# Patient Record
Sex: MALE | Race: WHITE | Employment: UNEMPLOYED | URBAN - METROPOLITAN AREA
[De-identification: names, ages, dates, MRNs, and addresses within clinical notes are randomized per-mention and may not be internally consistent; named-entity substitution may affect disease eponyms.]

---

## 2017-04-27 ENCOUNTER — OFFICE VISIT (OUTPATIENT)
Dept: CARDIOLOGY CLINIC | Age: 57
End: 2017-04-27

## 2017-04-27 VITALS
OXYGEN SATURATION: 97 % | BODY MASS INDEX: 24.44 KG/M2 | SYSTOLIC BLOOD PRESSURE: 112 MMHG | HEIGHT: 69 IN | DIASTOLIC BLOOD PRESSURE: 64 MMHG | WEIGHT: 165 LBS | HEART RATE: 82 BPM

## 2017-04-27 DIAGNOSIS — Z72.0 TOBACCO ABUSE: ICD-10-CM

## 2017-04-27 DIAGNOSIS — I20.9 ISCHEMIC CHEST PAIN (HCC): ICD-10-CM

## 2017-04-27 DIAGNOSIS — I10 ESSENTIAL HYPERTENSION: ICD-10-CM

## 2017-04-27 DIAGNOSIS — I35.0 NONRHEUMATIC AORTIC VALVE STENOSIS: ICD-10-CM

## 2017-04-27 DIAGNOSIS — I25.10 CORONARY ARTERY DISEASE INVOLVING NATIVE CORONARY ARTERY OF NATIVE HEART WITHOUT ANGINA PECTORIS: ICD-10-CM

## 2017-04-27 DIAGNOSIS — I35.9 AORTIC VALVE DISEASE: Primary | ICD-10-CM

## 2017-04-27 PROCEDURE — 99214 OFFICE O/P EST MOD 30 MIN: CPT | Performed by: INTERNAL MEDICINE

## 2017-05-01 ENCOUNTER — TELEPHONE (OUTPATIENT)
Dept: CARDIOLOGY CLINIC | Age: 57
End: 2017-05-01

## 2017-05-26 ENCOUNTER — OFFICE VISIT (OUTPATIENT)
Dept: CARDIOLOGY CLINIC | Age: 57
End: 2017-05-26

## 2017-05-26 VITALS
DIASTOLIC BLOOD PRESSURE: 74 MMHG | SYSTOLIC BLOOD PRESSURE: 142 MMHG | OXYGEN SATURATION: 97 % | HEART RATE: 67 BPM | HEIGHT: 69 IN | BODY MASS INDEX: 25.33 KG/M2 | WEIGHT: 171 LBS

## 2017-05-26 DIAGNOSIS — I25.118 CORONARY ARTERY DISEASE OF NATIVE ARTERY OF NATIVE HEART WITH STABLE ANGINA PECTORIS (HCC): ICD-10-CM

## 2017-05-26 DIAGNOSIS — I35.0 NONRHEUMATIC AORTIC VALVE STENOSIS: ICD-10-CM

## 2017-05-26 DIAGNOSIS — I20.9 ISCHEMIC CHEST PAIN (HCC): Primary | ICD-10-CM

## 2017-05-26 PROCEDURE — 99214 OFFICE O/P EST MOD 30 MIN: CPT | Performed by: INTERNAL MEDICINE

## 2017-05-26 RX ORDER — LISINOPRIL 10 MG/1
10 TABLET ORAL DAILY
Qty: 30 TABLET | Refills: 11 | Status: SHIPPED | OUTPATIENT
Start: 2017-05-26 | End: 2018-05-17 | Stop reason: SDUPTHER

## 2017-05-26 RX ORDER — NITROGLYCERIN 0.4 MG/1
0.4 TABLET SUBLINGUAL EVERY 5 MIN PRN
Qty: 25 TABLET | Refills: 3 | Status: SHIPPED | OUTPATIENT
Start: 2017-05-26

## 2017-05-26 RX ORDER — METOPROLOL SUCCINATE 50 MG/1
50 TABLET, EXTENDED RELEASE ORAL DAILY
Qty: 30 TABLET | Refills: 11 | Status: SHIPPED | OUTPATIENT
Start: 2017-05-26 | End: 2018-05-17 | Stop reason: SDUPTHER

## 2017-06-26 RX ORDER — ATORVASTATIN CALCIUM 80 MG/1
TABLET, FILM COATED ORAL
Qty: 30 TABLET | Refills: 5 | Status: SHIPPED | OUTPATIENT
Start: 2017-06-26 | End: 2017-12-20 | Stop reason: SDUPTHER

## 2017-10-09 ENCOUNTER — OFFICE VISIT (OUTPATIENT)
Dept: CARDIOLOGY CLINIC | Age: 57
End: 2017-10-09

## 2017-10-09 VITALS
DIASTOLIC BLOOD PRESSURE: 72 MMHG | SYSTOLIC BLOOD PRESSURE: 124 MMHG | OXYGEN SATURATION: 97 % | BODY MASS INDEX: 26.36 KG/M2 | HEIGHT: 69 IN | HEART RATE: 72 BPM | WEIGHT: 178 LBS

## 2017-10-09 DIAGNOSIS — R06.00 DYSPNEA, UNSPECIFIED TYPE: ICD-10-CM

## 2017-10-09 DIAGNOSIS — E78.2 MIXED HYPERLIPIDEMIA: ICD-10-CM

## 2017-10-09 DIAGNOSIS — Z72.0 TOBACCO ABUSE: ICD-10-CM

## 2017-10-09 DIAGNOSIS — I10 ESSENTIAL HYPERTENSION: Primary | ICD-10-CM

## 2017-10-09 DIAGNOSIS — I25.118 CORONARY ARTERY DISEASE OF NATIVE ARTERY OF NATIVE HEART WITH STABLE ANGINA PECTORIS (HCC): ICD-10-CM

## 2017-10-09 PROCEDURE — 99214 OFFICE O/P EST MOD 30 MIN: CPT | Performed by: INTERNAL MEDICINE

## 2017-10-09 RX ORDER — OMEPRAZOLE 20 MG/1
20 CAPSULE, DELAYED RELEASE ORAL DAILY
Qty: 30 CAPSULE | Refills: 3 | Status: SHIPPED | OUTPATIENT
Start: 2017-10-09 | End: 2018-02-19 | Stop reason: SDUPTHER

## 2017-10-09 NOTE — PROGRESS NOTES
[facility] Office Note  10/9/2017     Subjective:  Mr. Tian Montoya is here today for cardiology follow up HTN, CAD, tobacco abuse. Today he reports having brief episodes of chest pain, not requiring nitro. He continues to smoke 1.5 ppd. He is currently not working and is on disability. Denies active chest pain, shortness of breath, edema, dizziness, palpitations and syncope. HPI:   with PMH of tobacco abuse, untreated HTN, daily ETOH use who presents to [facility] 4/25/16 with complaint of chest pain  Describes it as a sharp heavy pressure sensation in the middle of his chest with SOB, and diaphoresis. It was advised to transfer to [facility] for cath but he chose to go home and come next day for procedure. Unfortunately overnight he had suffered a STEMI and was brought to cath lab 4/26/16 which revealed 80% ruptured plaque in mid left anterior descending, s/p 3.0 x 16mm Promus PREMIER drug-eluting stent. 50% mid large first diagonal branch,Severely reduced left ventricular systolic function with ejection fraction 30% with mid anterior, anteroapical as well as inferoapical akinesis. Markedly elevated left ventricular end-diastolic pressure, 36 mmHg. He reported chest pain going to rt arm mostly with exertion for last 6 months. Similar to prior heart attack. He reports not being able to work for [employer], and was let go of his job . He reports being very stressed out past due on rent and ready to be evicted. He continues to smoke but has cut back from 3 ppd to 1.5 ppd. He continues to drink 6-8 beers per day and 1-2 marijuana joints per day. Sonja Fuentes He underwent a cardiac cath on 5/5/17 that showed normal coronaries. He has mild aortic stenosis  He has been taking his medications as prescribed.   Review of Systems: 12 point ROS negative in all areas as listed below except as in [place]  Constitutional, EENT, Cardiovascular, pulmonary, GI, , Musculoskeletal, skin, neurological, hematological, endocrine, Psychiatric      Reviewed past medical history, social, and family history. Smoking 30 cigarettes a day  Smokes 6-8 beers per night and 2 joints of marijuana per day  Past Medical History:   Diagnosis Date    Carotid artery stenosis     Hyperlipidemia     Hypertension      Past Surgical History:   Procedure Laterality Date    CORONARY ANGIOPLASTY WITH STENT PLACEMENT      TONSILLECTOMY         Objective:   /72   Pulse 72   Ht 5' 9\" (1.753 m)   Wt 178 lb (80.7 kg)   SpO2 97%   BMI 26.29 kg/m²     Wt Readings from Last 3 Encounters:   10/09/17 178 lb (80.7 kg)   06/28/17 180 lb (81.6 kg)   05/26/17 171 lb (77.6 kg)       Physical Exam:  General: No Respiratory distress, appears well developed and well nourished. Eyes:  Sclera nonicteric  Nose/Sinuses:  negative findings: nose shows no deformity, asymmetry, or inflammation, nasal mucosa normal, septum midline with no perforation or bleeding  Back:  no pain to palpation  Joint:  no active joint inflammation  Musculoskeletal:  negative  Skin:  Warm and dry  Neck:  Negative for JVD and Carotid Bruits. Chest:  Diminished breath sounds bilat, respiration easy  Cardiovascular:  RRR, S1S2 normal,no murmur heard today  Abdomen:  Soft normal liver and spleen  Extremities:   No edema, clubbing, cyanosis,  Neuro: intact    Medications:   Outpatient Encounter Prescriptions as of 10/9/2017   Medication Sig Dispense Refill    indomethacin (INDOCIN) 50 MG capsule Take 1 capsule by mouth 2 times daily (with meals) 60 capsule 0    colchicine (COLCRYS) 0.6 MG tablet One tablet every two hours until relief of gouty pain and inflammation, up to a total of 3 mg or until upset stomach occurs.  15 tablet 0    atorvastatin (LIPITOR) 80 MG tablet TAKE ONE TABLET BY MOUTH ONCE NIGHTLY AT BEDTIME 30 tablet 5    lisinopril (PRINIVIL;ZESTRIL) 10 MG tablet Take 1 tablet by mouth daily 30 tablet 11    metoprolol succinate (TOPROL XL) 50 MG extended release tablet Take 1 [facility])    Essential hypertension    Tobacco abuse              Plan:  1. Chest X-ray  2. Lipid panel - FASTING  3. Healthy lifestyle education reviewed including nutrition, exercise and activity  4. Smoking cessation discussed at length  5. Follow up with me in 6 months         QUALITY MEASURES  1. Tobacco Cessation Counseling: Yes  2. Retake of BP if >140/90:   NA  3. Documentation to PCP/referring for new patient:  Sent to PCP at close of office visit  4. CAD patient on anti-platelet: Yes  5. CAD patient on STATIN therapy:  Yes  6.  Patient with CHF and aFib on anticoagulation:  NA     [address] Joaquin Wilkins MD 10/9/2017 3:39 PM

## 2017-10-09 NOTE — COMMUNICATION BODY
Khushi Soriano Office Note  10/9/2017     Subjective:  Mr. Diego Johnston is here today for cardiology follow up HTN, CAD, tobacco abuse. Today he reports having brief episodes of chest pain, not requiring nitro. He continues to smoke 1.5 ppd. He is currently not working and is on disability. Denies active chest pain, shortness of breath, edema, dizziness, palpitations and syncope. HPI:   with PMH of tobacco abuse, untreated HTN, daily ETOH use who presents to [facility] 4/25/16 with complaint of chest pain  Describes it as a sharp heavy pressure sensation in the middle of his chest with SOB, and diaphoresis. It was advised to transfer to [facility] for cath but he chose to go home and come next day for procedure. Unfortunately overnight he had suffered a STEMI and was brought to cath lab 4/26/16 which revealed 80% ruptured plaque in mid left anterior descending, s/p 3.0 x 16mm Promus PREMIER drug-eluting stent. 50% mid large first diagonal branch,Severely reduced left ventricular systolic function with ejection fraction 30% with mid anterior, anteroapical as well as inferoapical akinesis. Markedly elevated left ventricular end-diastolic pressure, 36 mmHg. He reported chest pain going to rt arm mostly with exertion for last 6 months. Similar to prior heart attack. He reports not being able to work for [employer], and was let go of his job . He reports being very stressed out past due on rent and ready to be evicted. He continues to smoke but has cut back from 3 ppd to 1.5 ppd. He continues to drink 6-8 beers per day and 1-2 marijuana joints per day. Chon Dunaway He underwent a cardiac cath on 5/5/17 that showed normal coronaries. He has mild aortic stenosis  He has been taking his medications as prescribed.   Review of Systems: 12 point ROS negative in all areas as listed below except as in [place]  Constitutional, EENT, Cardiovascular, pulmonary, GI, , Musculoskeletal, skin, neurological, hematological, endocrine, Mild-moderate aortic regurgitation is present. Echo doppler 4.25.16  Summary  Normal left ventricle size and systolic function with an estimated ejection  fraction of 55%. No regional wall motion abnormalities are seen. There is mild concentric left ventricular hypertrophy. Diastolic filling parameters suggest diastolic dysfunction. Mitral valve is structurally normal.  Mitral valve leaflets appear to open adequately. Mild mitral regurgitation is present. The aortic valve is thickened/calcified with decreased leaflet mobility  consistent with aortic stenosis. The aortic valve area is calculated at 1.32 cm2 with a maximum transaortic  velocity of 3.80 m/sec, a maximum pressure gradient of 58 mmHg and a mean  pressure gradient of 26 mmHg. This is c/w moderate aortic stenosis. Moderate aortic regurgitation is present  CATH 4/26/16  IMPRESSION:  1. A 80% ruptured plaque in mid left anterior descending, s/p 3.0 x 16mm Promus PREMIER  drug-eluting stent. 2. A 50% mid large first diagonal branch. 3. Severely reduced left ventricular systolic function with ejection  fraction 30% with mid anterior, anteroapical as well as inferoapical akinesis. 4. Markedly elevated left ventricular end-diastolic pressure, 36 mmHg. Cath 5/5/17  Findings:  1. No significant coronary artery disease noted within the major epicardial arteries. ~patent LAD stent  2. Normal LVEF  3. Mild aortic valve stenosis   ~23mmHg peak to peak gradient      Conclusion/plan of care:  1. Medical management  2. Stop smoking as probably has endothelial dysfunciton    Assessment:  Kimberly Tee was seen today for 6 month follow-up, coronary artery disease, hypertension, cardiac valve problem, chest pain, shortness of breath and fatigue.     Diagnoses and all orders for this visit:    Aortic valve stenosis mild per cath    Coronary artery disease involving native coronary artery of native heart with unstable  angina pectoris    Ischemic chest pain [facility])    Essential hypertension    Tobacco abuse              Plan:  1. Chest X-ray  2. Lipid panel - FASTING  3. Healthy lifestyle education reviewed including nutrition, exercise and activity  4. Smoking cessation discussed at length  5. Follow up with me in 6 months         QUALITY MEASURES  1. Tobacco Cessation Counseling: Yes  2. Retake of BP if >140/90:   NA  3. Documentation to PCP/referring for new patient:  Sent to PCP at close of office visit  4. CAD patient on anti-platelet: Yes  5. CAD patient on STATIN therapy:  Yes  6.  Patient with CHF and aFib on anticoagulation:  NA     [address] [employer] Sophia Norman MD 10/9/2017 3:39 PM

## 2017-10-13 ENCOUNTER — HOSPITAL ENCOUNTER (OUTPATIENT)
Dept: OTHER | Age: 57
Discharge: HOME | End: 2017-10-13
Attending: INTERNAL MEDICINE | Admitting: INTERNAL MEDICINE

## 2017-10-13 ENCOUNTER — TELEPHONE (OUTPATIENT)
Dept: CARDIOLOGY CLINIC | Age: 57
End: 2017-10-13

## 2017-10-13 DIAGNOSIS — R06.00 DYSPNEA, UNSPECIFIED TYPE: ICD-10-CM

## 2017-10-13 LAB
CHOLESTEROL, FASTING: 119 MG/DL (ref 0–199)
HDLC SERPL-MCNC: 42 MG/DL (ref 40–60)
LDL CHOLESTEROL CALCULATED: 30 MG/DL
TRIGLYCERIDE, FASTING: 234 MG/DL (ref 0–150)
VLDLC SERPL CALC-MCNC: 47 MG/DL

## 2017-10-13 NOTE — TELEPHONE ENCOUNTER
----- Message from Rosy Peterson MD sent at 10/13/2017  2:19 PM EDT -----  Chest xray shows emphysema. He should not be smoking.

## 2017-11-20 NOTE — TELEPHONE ENCOUNTER
Pt called again stating his pharmacy still has not received his medication refill request. Pt stated he has been out of the medication for two days now. Please send medication refill to pt's pharmacy today.

## 2017-11-21 RX ORDER — CLOPIDOGREL BISULFATE 75 MG/1
75 TABLET ORAL DAILY
Qty: 30 TABLET | Refills: 5 | Status: SHIPPED | OUTPATIENT
Start: 2017-11-21 | End: 2018-05-17 | Stop reason: SDUPTHER

## 2017-12-20 NOTE — TELEPHONE ENCOUNTER
Last ov-10/9/17 with RKG  Next ov- not scheduled, due 3/2018  Last labs-10/13/17 Lipid  Last EKG-5/5/17

## 2017-12-21 RX ORDER — ATORVASTATIN CALCIUM 80 MG/1
TABLET, FILM COATED ORAL
Qty: 30 TABLET | Refills: 5 | Status: SHIPPED | OUTPATIENT
Start: 2017-12-21 | End: 2018-07-18 | Stop reason: SDUPTHER

## 2018-02-19 RX ORDER — OMEPRAZOLE 20 MG/1
20 CAPSULE, DELAYED RELEASE ORAL DAILY
Qty: 30 CAPSULE | Refills: 5 | Status: SHIPPED | OUTPATIENT
Start: 2018-02-19 | End: 2018-08-15 | Stop reason: SDUPTHER

## 2018-03-15 LAB
ALBUMIN SERPL-MCNC: 4.7 G/DL
ALP BLD-CCNC: 556 U/L
ALT SERPL-CCNC: 25 U/L
ANION GAP SERPL CALCULATED.3IONS-SCNC: 1.9 MMOL/L
AST SERPL-CCNC: 22 U/L
BILIRUB SERPL-MCNC: 0.4 MG/DL (ref 0.1–1.4)
BUN BLDV-MCNC: 14 MG/DL
CALCIUM SERPL-MCNC: 9.5 MG/DL
CHLORIDE BLD-SCNC: 96 MMOL/L
CHOLESTEROL, TOTAL: 123 MG/DL
CHOLESTEROL/HDL RATIO: NORMAL
CO2: 23 MMOL/L
CREAT SERPL-MCNC: 1.17 MG/DL
GFR CALCULATED: NORMAL
GLUCOSE BLD-MCNC: 89 MG/DL
HDLC SERPL-MCNC: 44 MG/DL (ref 35–70)
LDL CHOLESTEROL CALCULATED: 22 MG/DL (ref 0–160)
POTASSIUM SERPL-SCNC: 4.9 MMOL/L
SODIUM BLD-SCNC: 137 MMOL/L
TOTAL PROTEIN: 7.2
TRIGL SERPL-MCNC: 286 MG/DL
VLDLC SERPL CALC-MCNC: 57 MG/DL

## 2018-04-09 ENCOUNTER — OFFICE VISIT (OUTPATIENT)
Dept: CARDIOLOGY CLINIC | Age: 58
End: 2018-04-09

## 2018-04-09 VITALS
HEART RATE: 68 BPM | OXYGEN SATURATION: 99 % | WEIGHT: 193 LBS | DIASTOLIC BLOOD PRESSURE: 72 MMHG | SYSTOLIC BLOOD PRESSURE: 126 MMHG | HEIGHT: 69 IN | BODY MASS INDEX: 28.58 KG/M2

## 2018-04-09 DIAGNOSIS — I35.9 AORTIC VALVE DISEASE: ICD-10-CM

## 2018-04-09 DIAGNOSIS — I10 ESSENTIAL HYPERTENSION: ICD-10-CM

## 2018-04-09 DIAGNOSIS — I25.10 CORONARY ARTERY DISEASE INVOLVING NATIVE CORONARY ARTERY OF NATIVE HEART WITHOUT ANGINA PECTORIS: Primary | ICD-10-CM

## 2018-04-09 PROCEDURE — 4004F PT TOBACCO SCREEN RCVD TLK: CPT | Performed by: INTERNAL MEDICINE

## 2018-04-09 PROCEDURE — 99214 OFFICE O/P EST MOD 30 MIN: CPT | Performed by: INTERNAL MEDICINE

## 2018-04-09 PROCEDURE — G8427 DOCREV CUR MEDS BY ELIG CLIN: HCPCS | Performed by: INTERNAL MEDICINE

## 2018-04-09 PROCEDURE — G8598 ASA/ANTIPLAT THER USED: HCPCS | Performed by: INTERNAL MEDICINE

## 2018-04-09 PROCEDURE — G8419 CALC BMI OUT NRM PARAM NOF/U: HCPCS | Performed by: INTERNAL MEDICINE

## 2018-04-09 PROCEDURE — 3017F COLORECTAL CA SCREEN DOC REV: CPT | Performed by: INTERNAL MEDICINE

## 2018-04-09 RX ORDER — INDOMETHACIN 50 MG/1
50 CAPSULE ORAL PRN
Qty: 60 CAPSULE | Refills: 0
Start: 2018-04-09 | End: 2022-06-09

## 2018-04-09 RX ORDER — COLCHICINE 0.6 MG/1
TABLET ORAL
Qty: 15 TABLET | Refills: 0
Start: 2018-04-09 | End: 2022-10-21 | Stop reason: SDUPTHER

## 2018-05-17 RX ORDER — CLOPIDOGREL BISULFATE 75 MG/1
75 TABLET ORAL DAILY
Qty: 30 TABLET | Refills: 5 | Status: SHIPPED | OUTPATIENT
Start: 2018-05-17 | End: 2018-10-09 | Stop reason: SDUPTHER

## 2018-05-17 RX ORDER — LISINOPRIL 10 MG/1
TABLET ORAL
Qty: 90 TABLET | Refills: 3 | Status: SHIPPED | OUTPATIENT
Start: 2018-05-17 | End: 2019-05-18 | Stop reason: SDUPTHER

## 2018-05-17 RX ORDER — METOPROLOL SUCCINATE 50 MG/1
50 TABLET, EXTENDED RELEASE ORAL DAILY
Qty: 30 TABLET | Refills: 11 | Status: SHIPPED | OUTPATIENT
Start: 2018-05-17 | End: 2019-04-09 | Stop reason: SDUPTHER

## 2018-07-19 RX ORDER — ATORVASTATIN CALCIUM 80 MG/1
TABLET, FILM COATED ORAL
Qty: 30 TABLET | Refills: 5 | Status: SHIPPED | OUTPATIENT
Start: 2018-07-19 | End: 2019-02-15 | Stop reason: SDUPTHER

## 2018-08-06 ENCOUNTER — HOSPITAL ENCOUNTER (OUTPATIENT)
Dept: NON INVASIVE DIAGNOSTICS | Age: 58
Discharge: HOME | End: 2018-08-06
Payer: COMMERCIAL

## 2018-08-06 DIAGNOSIS — I25.10 ATHEROSCLEROTIC HEART DISEASE OF NATIVE CORONARY ARTERY WITHOUT ANGINA PECTORIS: ICD-10-CM

## 2018-08-06 DIAGNOSIS — I35.9 AORTIC VALVE DISEASE: ICD-10-CM

## 2018-08-06 DIAGNOSIS — I25.10 CORONARY ARTERY DISEASE INVOLVING NATIVE CORONARY ARTERY OF NATIVE HEART WITHOUT ANGINA PECTORIS: ICD-10-CM

## 2018-08-06 LAB
LV EF: 55 %
LVEF MODALITY: NORMAL

## 2018-08-06 PROCEDURE — 93306 TTE W/DOPPLER COMPLETE: CPT

## 2018-08-08 ENCOUNTER — TELEPHONE (OUTPATIENT)
Dept: CARDIOLOGY CLINIC | Age: 58
End: 2018-08-08

## 2018-08-15 RX ORDER — OMEPRAZOLE 20 MG/1
20 CAPSULE, DELAYED RELEASE ORAL DAILY
Qty: 30 CAPSULE | Refills: 5 | Status: SHIPPED | OUTPATIENT
Start: 2018-08-15 | End: 2019-02-15 | Stop reason: SDUPTHER

## 2018-10-09 ENCOUNTER — OFFICE VISIT (OUTPATIENT)
Dept: CARDIOLOGY CLINIC | Age: 58
End: 2018-10-09
Payer: COMMERCIAL

## 2018-10-09 VITALS
HEART RATE: 71 BPM | DIASTOLIC BLOOD PRESSURE: 72 MMHG | SYSTOLIC BLOOD PRESSURE: 124 MMHG | OXYGEN SATURATION: 97 % | BODY MASS INDEX: 29.03 KG/M2 | HEIGHT: 69 IN | WEIGHT: 196 LBS

## 2018-10-09 DIAGNOSIS — I25.118 CORONARY ARTERY DISEASE OF NATIVE ARTERY OF NATIVE HEART WITH STABLE ANGINA PECTORIS (HCC): Primary | ICD-10-CM

## 2018-10-09 DIAGNOSIS — I35.9 AORTIC VALVE DISEASE: ICD-10-CM

## 2018-10-09 DIAGNOSIS — I10 ESSENTIAL HYPERTENSION: ICD-10-CM

## 2018-10-09 PROCEDURE — G8427 DOCREV CUR MEDS BY ELIG CLIN: HCPCS | Performed by: INTERNAL MEDICINE

## 2018-10-09 PROCEDURE — G8484 FLU IMMUNIZE NO ADMIN: HCPCS | Performed by: INTERNAL MEDICINE

## 2018-10-09 PROCEDURE — 3017F COLORECTAL CA SCREEN DOC REV: CPT | Performed by: INTERNAL MEDICINE

## 2018-10-09 PROCEDURE — 4004F PT TOBACCO SCREEN RCVD TLK: CPT | Performed by: INTERNAL MEDICINE

## 2018-10-09 PROCEDURE — G8598 ASA/ANTIPLAT THER USED: HCPCS | Performed by: INTERNAL MEDICINE

## 2018-10-09 PROCEDURE — 99214 OFFICE O/P EST MOD 30 MIN: CPT | Performed by: INTERNAL MEDICINE

## 2018-10-09 PROCEDURE — G8419 CALC BMI OUT NRM PARAM NOF/U: HCPCS | Performed by: INTERNAL MEDICINE

## 2018-10-09 RX ORDER — CLOPIDOGREL BISULFATE 75 MG/1
75 TABLET ORAL DAILY
Qty: 30 TABLET | Refills: 11 | Status: SHIPPED | OUTPATIENT
Start: 2018-10-09 | End: 2019-10-15 | Stop reason: SDUPTHER

## 2018-10-09 NOTE — PROGRESS NOTES
visit:    Aortic valve Mild stenosis mild aortic regurgitation on  echo doppler Aug 2018    Coronary artery disease involving native coronary artery of native heart with  stable  angina pectoris    Essential hypertension    Tobacco abuse 1/2 pack per day    Plan:  1. Healthy lifestyle education reviewed including nutrition, exercise and activity  2. Smoking cessation discussed   3. meds reviewed and refilled as warranted  4. Follow up with me in 6 months will check cmp, lipids prior to visit           QUALITY MEASURES  1. Tobacco Cessation Counseling: Yes  2. Retake of BP if >140/90:   NA  3. Documentation to PCP/referring for new patient:  Sent to PCP at close of office visit  4. CAD patient on anti-platelet: Yes  5. CAD patient on STATIN therapy:  Yes  6.  Patient with CHF and aFib on anticoagulation:  NA     Anthony Otto MD 10/9/2018 12:24 PM

## 2019-02-15 RX ORDER — ATORVASTATIN CALCIUM 80 MG/1
TABLET, FILM COATED ORAL
Qty: 30 TABLET | Refills: 5 | Status: SHIPPED | OUTPATIENT
Start: 2019-02-15 | End: 2019-08-16 | Stop reason: SDUPTHER

## 2019-02-15 RX ORDER — OMEPRAZOLE 20 MG/1
20 CAPSULE, DELAYED RELEASE ORAL DAILY
Qty: 30 CAPSULE | Refills: 5 | Status: SHIPPED | OUTPATIENT
Start: 2019-02-15 | End: 2019-08-16 | Stop reason: SDUPTHER

## 2019-04-09 ENCOUNTER — OFFICE VISIT (OUTPATIENT)
Dept: CARDIOLOGY CLINIC | Age: 59
End: 2019-04-09
Payer: COMMERCIAL

## 2019-04-09 VITALS
WEIGHT: 196 LBS | HEART RATE: 73 BPM | DIASTOLIC BLOOD PRESSURE: 68 MMHG | OXYGEN SATURATION: 97 % | SYSTOLIC BLOOD PRESSURE: 120 MMHG | BODY MASS INDEX: 29.03 KG/M2 | HEIGHT: 69 IN

## 2019-04-09 DIAGNOSIS — I10 ESSENTIAL HYPERTENSION: ICD-10-CM

## 2019-04-09 DIAGNOSIS — I35.0 NONRHEUMATIC AORTIC VALVE STENOSIS: ICD-10-CM

## 2019-04-09 DIAGNOSIS — I25.118 CORONARY ARTERY DISEASE OF NATIVE ARTERY OF NATIVE HEART WITH STABLE ANGINA PECTORIS (HCC): Primary | ICD-10-CM

## 2019-04-09 PROCEDURE — G8598 ASA/ANTIPLAT THER USED: HCPCS | Performed by: INTERNAL MEDICINE

## 2019-04-09 PROCEDURE — 3017F COLORECTAL CA SCREEN DOC REV: CPT | Performed by: INTERNAL MEDICINE

## 2019-04-09 PROCEDURE — 4004F PT TOBACCO SCREEN RCVD TLK: CPT | Performed by: INTERNAL MEDICINE

## 2019-04-09 PROCEDURE — G8427 DOCREV CUR MEDS BY ELIG CLIN: HCPCS | Performed by: INTERNAL MEDICINE

## 2019-04-09 PROCEDURE — 99214 OFFICE O/P EST MOD 30 MIN: CPT | Performed by: INTERNAL MEDICINE

## 2019-04-09 PROCEDURE — G8419 CALC BMI OUT NRM PARAM NOF/U: HCPCS | Performed by: INTERNAL MEDICINE

## 2019-04-09 RX ORDER — METOPROLOL SUCCINATE 50 MG/1
50 TABLET, EXTENDED RELEASE ORAL DAILY
Qty: 30 TABLET | Refills: 11 | Status: SHIPPED | OUTPATIENT
Start: 2019-04-09 | End: 2020-04-08

## 2019-04-09 NOTE — PATIENT INSTRUCTIONS
[address] Office Note  4/9/2019     Subjective:  Mr. Evonne Blackmon is here today for cardiology follow up HTN, CAD, tobacco abuse, aortic stenosis. Today he reports to feeling good overall. He continues to smoke 1.5 ppd. He is currently  on disability. Denies active chest pain, shortness of breath, edema, dizziness, palpitations and syncope. He continues to drink 9  Beers on Saturday as well as smoking marijuana also on Saturday. HPI:   with PMH of tobacco abuse, untreated HTN, daily ETOH use who presents to [facility] 4/25/16 with complaint of chest pain  Describes it as a sharp heavy pressure sensation in the middle of his chest with SOB, and diaphoresis. It was advised to transfer to [facility] for cath but he chose to go home and come next day for procedure. Unfortunately overnight he had suffered a STEMI and was brought to cath lab 4/26/16 which revealed 80% ruptured plaque in mid left anterior descending, s/p 3.0 x 16mm Promus PREMIER drug-eluting stent. 50% mid large first diagonal branch,Severely reduced left ventricular systolic function with ejection fraction 30% with mid anterior, anteroapical as well as inferoapical akinesis. Markedly elevated left ventricular end-diastolic pressure, 36 mmHg. He reported chest pain going to rt arm mostly with exertion for last 6 months. Similar to prior heart attack. He reports not being able to work for [employer], and was let go of his job . He reports being very stressed out past due on rent and ready to be evicted. He continues to smoke but has cut back from 3 ppd to 1.5 ppd. He continues to drink 6-8 beers per day and 1-2 marijuana joints per day. Maxwell Brown He underwent a cardiac cath on 5/5/17 that showed normal coronaries. He has mild aortic stenosis  He has been taking his medications as prescribed.   Review of Systems: 12 point ROS negative in all areas as listed below except as in [place]  Constitutional, EENT, Cardiovascular, pulmonary, GI, , Musculoskeletal, skin, neurological, hematological, endocrine, Psychiatric      Reviewed past medical history, social, and family history. Smoking 30 cigarettes a day  Smokes 6-8 beers per night and 2 joints of marijuana per day  Past Medical History:   Diagnosis Date    Carotid artery stenosis     Hyperlipidemia     Hypertension      Past Surgical History:   Procedure Laterality Date    CORONARY ANGIOPLASTY WITH STENT PLACEMENT      TONSILLECTOMY         Objective:   /68   Pulse 73   Ht 5' 9\" (1.753 m)   Wt 196 lb (88.9 kg)   SpO2 97%   BMI 28.94 kg/m²      Wt Readings from Last 3 Encounters:   04/09/19 196 lb (88.9 kg)   10/09/18 196 lb (88.9 kg)   06/20/18 170 lb (77.1 kg)       Physical Exam:  General: No Respiratory distress, appears well developed and well nourished. Eyes:  Sclera nonicteric  Nose/Sinuses:  negative findings: nose shows no deformity, asymmetry, or inflammation, nasal mucosa normal, septum midline with no perforation or bleeding  Back:  no pain to palpation  Joint:  no active joint inflammation  Musculoskeletal:  negative  Skin:  Warm and dry  Neck:  Negative for JVD and Carotid Bruits.    Chest:  Clear breath sounds bilat, respiration easy  Cardiovascular:  RRR, S1S2 normal, 1/6 GALINA at base of heart  Abdomen:  Soft normal liver and spleen  Extremities:   No edema, clubbing, cyanosis,  Neuro: intact    Medications:   Outpatient Encounter Medications as of 4/9/2019   Medication Sig Dispense Refill    omeprazole (PRILOSEC) 20 MG delayed release capsule Take 1 capsule by mouth daily 30 capsule 5    atorvastatin (LIPITOR) 80 MG tablet TAKE ONE TABLET BY MOUTH EVERY NIGHT AT BEDTIME 30 tablet 5    clopidogrel (PLAVIX) 75 MG tablet Take 1 tablet by mouth daily 30 tablet 11    lisinopril (PRINIVIL;ZESTRIL) 10 MG tablet TAKE ONE TABLET BY MOUTH DAILY 90 tablet 3    metoprolol succinate (TOPROL XL) 50 MG extended release tablet Take 1 tablet by mouth daily 30 tablet 11    Overall left ventricular   function is normal.   No regional wall motion abnormalities are noted.   AV: mild-moderate aortic stenosis. Mild-moderate aortic regurgitation is present. Echo doppler 4.25.16  Summary  Normal left ventricle size and systolic function with an estimated ejection  fraction of 55%. No regional wall motion abnormalities are seen. There is mild concentric left ventricular hypertrophy. Diastolic filling parameters suggest diastolic dysfunction. Mitral valve is structurally normal.  Mitral valve leaflets appear to open adequately. Mild mitral regurgitation is present. The aortic valve is thickened/calcified with decreased leaflet mobility  consistent with aortic stenosis. The aortic valve area is calculated at 1.32 cm2 with a maximum transaortic  velocity of 3.80 m/sec, a maximum pressure gradient of 58 mmHg and a mean  pressure gradient of 26 mmHg. This is c/w moderate aortic stenosis. Moderate aortic regurgitation is present  CATH 4/26/16  IMPRESSION:  1. A 80% ruptured plaque in mid left anterior descending, s/p 3.0 x 16mm Promus PREMIER  drug-eluting stent. 2. A 50% mid large first diagonal branch. 3. Severely reduced left ventricular systolic function with ejection  fraction 30% with mid anterior, anteroapical as well as inferoapical akinesis. 4. Markedly elevated left ventricular end-diastolic pressure, 36 mmHg. Cath 5/5/17  Findings:  1. No significant coronary artery disease noted within the major epicardial arteries. ~patent LAD stent  2. Normal LVEF  3. Mild aortic valve stenosis   ~23mmHg peak to peak gradient      Conclusion/plan of care:  1. Medical management  2.  Stop smoking as probably has endothelial dysfunciton    Assessment:  Yaima Rush was seen today for 6 month follow-up, coronary artery disease, hypertension, cardiac valve problem,Diagnoses and all orders for this visit:    Aortic valve Mild stenosis mild aortic regurgitation on  echo doppler Aug 2018    Coronary artery disease involving native coronary artery of native heart with  stable  angina pectoris  Last lipids 3/15/18 I personally reviewed labs results in epic and discussed with patient      Essential hypertension    Tobacco abuse 11/2 pack per day    Plan:  1. Healthy lifestyle education reviewed including nutrition, exercise and activity  2. Smoking cessation discussed   3. meds reviewed and refilled as warranted  4. Follow up with me in 6 months   5. will check cmp, lipids now   6. Recheck ECHO early September 1-800 quit now advised for free smoking cessation program        QUALITY MEASURES  1. Tobacco Cessation Counseling: Yes  2. Retake of BP if >140/90:   NA  3. Documentation to PCP/referring for new patient:  Sent to PCP at close of office visit  4. CAD patient on anti-platelet: Yes  5. CAD patient on STATIN therapy:  Yes  6. Patient with CHF and aFib on anticoagulation:  NA     This note was scribed in the presence of  Rima William MD by Edvin Pabon RN  I, Dr. Rima William, personally performed the services described in this documentation, as scribed by the above signed scribe in my presence. It is both accurate and complete to my knowledge. I agree with the details independently gathered by the clinical support staff, while the remaining scribed note accurately describes my personal service to the patient.       [address] Jean Thakkar MD 4/9/2019 2:00 PM

## 2019-04-09 NOTE — PROGRESS NOTES
[facility] Office Note  4/9/2019     Subjective:  Mr. Aorldo Goodwin is here today for cardiology follow up HTN, CAD, tobacco abuse, aortic stenosis. Today he reports to feeling good overall. He continues to smoke 1.5 ppd. He is currently  on disability. Denies active chest pain, shortness of breath, edema, dizziness, palpitations and syncope. He continues to drink 9  Beers on Saturday as well as smoking marijuana also on Saturday. HPI:   with PMH of tobacco abuse, untreated HTN, daily ETOH use who presents to [facility] 4/25/16 with complaint of chest pain  Describes it as a sharp heavy pressure sensation in the middle of his chest with SOB, and diaphoresis. It was advised to transfer to [facility] for cath but he chose to go home and come next day for procedure. Unfortunately overnight he had suffered a STEMI and was brought to cath lab 4/26/16 which revealed 80% ruptured plaque in mid left anterior descending, s/p 3.0 x 16mm Promus PREMIER drug-eluting stent. 50% mid large first diagonal branch,Severely reduced left ventricular systolic function with ejection fraction 30% with mid anterior, anteroapical as well as inferoapical akinesis. Markedly elevated left ventricular end-diastolic pressure, 36 mmHg. He reported chest pain going to rt arm mostly with exertion for last 6 months. Similar to prior heart attack. He reports not being able to work for [employer], and was let go of his job . He reports being very stressed out past due on rent and ready to be evicted. He continues to smoke but has cut back from 3 ppd to 1.5 ppd. He continues to drink 6-8 beers per day and 1-2 marijuana joints per day. Adeline Johnson He underwent a cardiac cath on 5/5/17 that showed normal coronaries. He has mild aortic stenosis  He has been taking his medications as prescribed.   Review of Systems: 12 point ROS negative in all areas as listed below except as in [place]  Constitutional, EENT, Cardiovascular, pulmonary, GI, , Musculoskeletal, skin, neurological, hematological, endocrine, Psychiatric      Reviewed past medical history, social, and family history. Smoking 30 cigarettes a day  Smokes 6-8 beers per night and 2 joints of marijuana per day  Past Medical History:   Diagnosis Date    Carotid artery stenosis     Hyperlipidemia     Hypertension      Past Surgical History:   Procedure Laterality Date    CORONARY ANGIOPLASTY WITH STENT PLACEMENT      TONSILLECTOMY         Objective:   /68   Pulse 73   Ht 5' 9\" (1.753 m)   Wt 196 lb (88.9 kg)   SpO2 97%   BMI 28.94 kg/m²     Wt Readings from Last 3 Encounters:   04/09/19 196 lb (88.9 kg)   10/09/18 196 lb (88.9 kg)   06/20/18 170 lb (77.1 kg)       Physical Exam:  General: No Respiratory distress, appears well developed and well nourished. Eyes:  Sclera nonicteric  Nose/Sinuses:  negative findings: nose shows no deformity, asymmetry, or inflammation, nasal mucosa normal, septum midline with no perforation or bleeding  Back:  no pain to palpation  Joint:  no active joint inflammation  Musculoskeletal:  negative  Skin:  Warm and dry  Neck:  Negative for JVD and Carotid Bruits.    Chest:  Clear breath sounds bilat, respiration easy  Cardiovascular:  RRR, S1S2 normal, 1/6 GALINA at base of heart  Abdomen:  Soft normal liver and spleen  Extremities:   No edema, clubbing, cyanosis,  Neuro: intact    Medications:   Outpatient Encounter Medications as of 4/9/2019   Medication Sig Dispense Refill    omeprazole (PRILOSEC) 20 MG delayed release capsule Take 1 capsule by mouth daily 30 capsule 5    atorvastatin (LIPITOR) 80 MG tablet TAKE ONE TABLET BY MOUTH EVERY NIGHT AT BEDTIME 30 tablet 5    clopidogrel (PLAVIX) 75 MG tablet Take 1 tablet by mouth daily 30 tablet 11    lisinopril (PRINIVIL;ZESTRIL) 10 MG tablet TAKE ONE TABLET BY MOUTH DAILY 90 tablet 3    metoprolol succinate (TOPROL XL) 50 MG extended release tablet Take 1 tablet by mouth daily 30 tablet 11    Overall left ventricular   function is normal.   No regional wall motion abnormalities are noted.   AV: mild-moderate aortic stenosis. Mild-moderate aortic regurgitation is present. Echo doppler 4.25.16  Summary  Normal left ventricle size and systolic function with an estimated ejection  fraction of 55%. No regional wall motion abnormalities are seen. There is mild concentric left ventricular hypertrophy. Diastolic filling parameters suggest diastolic dysfunction. Mitral valve is structurally normal.  Mitral valve leaflets appear to open adequately. Mild mitral regurgitation is present. The aortic valve is thickened/calcified with decreased leaflet mobility  consistent with aortic stenosis. The aortic valve area is calculated at 1.32 cm2 with a maximum transaortic  velocity of 3.80 m/sec, a maximum pressure gradient of 58 mmHg and a mean  pressure gradient of 26 mmHg. This is c/w moderate aortic stenosis. Moderate aortic regurgitation is present  CATH 4/26/16  IMPRESSION:  1. A 80% ruptured plaque in mid left anterior descending, s/p 3.0 x 16mm Promus PREMIER  drug-eluting stent. 2. A 50% mid large first diagonal branch. 3. Severely reduced left ventricular systolic function with ejection  fraction 30% with mid anterior, anteroapical as well as inferoapical akinesis. 4. Markedly elevated left ventricular end-diastolic pressure, 36 mmHg. Cath 5/5/17  Findings:  1. No significant coronary artery disease noted within the major epicardial arteries. ~patent LAD stent  2. Normal LVEF  3. Mild aortic valve stenosis   ~23mmHg peak to peak gradient      Conclusion/plan of care:  1. Medical management  2.  Stop smoking as probably has endothelial dysfunciton    Assessment:  Rosangela Martin was seen today for 6 month follow-up, coronary artery disease, hypertension, cardiac valve problem,Diagnoses and all orders for this visit:    Aortic valve Mild stenosis mild aortic regurgitation on  echo doppler Aug

## 2019-05-20 RX ORDER — LISINOPRIL 10 MG/1
TABLET ORAL
Qty: 90 TABLET | Refills: 2 | Status: SHIPPED | OUTPATIENT
Start: 2019-05-20 | End: 2020-02-10

## 2019-08-16 RX ORDER — OMEPRAZOLE 20 MG/1
CAPSULE, DELAYED RELEASE ORAL
Qty: 90 CAPSULE | Refills: 3 | Status: SHIPPED | OUTPATIENT
Start: 2019-08-16 | End: 2019-10-10 | Stop reason: ALTCHOICE

## 2019-08-16 RX ORDER — ATORVASTATIN CALCIUM 80 MG/1
TABLET, FILM COATED ORAL
Qty: 90 TABLET | Refills: 3 | Status: SHIPPED | OUTPATIENT
Start: 2019-08-16 | End: 2020-08-06

## 2019-09-24 ENCOUNTER — HOSPITAL ENCOUNTER (OUTPATIENT)
Dept: NON INVASIVE DIAGNOSTICS | Age: 59
Discharge: HOME | End: 2019-09-24
Payer: COMMERCIAL

## 2019-09-24 ENCOUNTER — HOSPITAL ENCOUNTER (OUTPATIENT)
Age: 59
Discharge: HOME | End: 2019-09-24
Payer: COMMERCIAL

## 2019-09-24 DIAGNOSIS — I10 ESSENTIAL HYPERTENSION: ICD-10-CM

## 2019-09-24 DIAGNOSIS — I25.118 CORONARY ARTERY DISEASE OF NATIVE ARTERY OF NATIVE HEART WITH STABLE ANGINA PECTORIS (HCC): ICD-10-CM

## 2019-09-24 DIAGNOSIS — I35.0 NONRHEUMATIC AORTIC VALVE STENOSIS: ICD-10-CM

## 2019-09-24 LAB
A/G RATIO: 1.6 (ref 1.1–2.2)
ALBUMIN SERPL-MCNC: 4.5 G/DL (ref 3.4–5)
ALP BLD-CCNC: 53 U/L (ref 40–129)
ALT SERPL-CCNC: 23 U/L (ref 10–40)
ANION GAP SERPL CALCULATED.3IONS-SCNC: 14 MMOL/L (ref 3–16)
AST SERPL-CCNC: 17 U/L (ref 15–37)
BILIRUB SERPL-MCNC: 0.5 MG/DL (ref 0–1)
BUN BLDV-MCNC: 9 MG/DL (ref 7–20)
CALCIUM SERPL-MCNC: 9.4 MG/DL (ref 8.3–10.6)
CHLORIDE BLD-SCNC: 102 MMOL/L (ref 99–110)
CHOLESTEROL, FASTING: 113 MG/DL (ref 0–199)
CO2: 25 MMOL/L (ref 21–32)
CREAT SERPL-MCNC: 1 MG/DL (ref 0.9–1.3)
GFR AFRICAN AMERICAN: >60
GFR NON-AFRICAN AMERICAN: >60
GLOBULIN: 2.9 G/DL
GLUCOSE FASTING: 100 MG/DL (ref 70–99)
HDLC SERPL-MCNC: 42 MG/DL (ref 40–60)
LDL CHOLESTEROL CALCULATED: 18 MG/DL
LV EF: 55 %
LVEF MODALITY: NORMAL
POTASSIUM SERPL-SCNC: 4.4 MMOL/L (ref 3.5–5.1)
SODIUM BLD-SCNC: 141 MMOL/L (ref 136–145)
TOTAL PROTEIN: 7.4 G/DL (ref 6.4–8.2)
TRIGLYCERIDE, FASTING: 265 MG/DL (ref 0–150)
VLDLC SERPL CALC-MCNC: 53 MG/DL

## 2019-09-24 PROCEDURE — 80053 COMPREHEN METABOLIC PANEL: CPT

## 2019-09-24 PROCEDURE — 93306 TTE W/DOPPLER COMPLETE: CPT

## 2019-09-24 PROCEDURE — 36415 COLL VENOUS BLD VENIPUNCTURE: CPT

## 2019-09-24 PROCEDURE — 80061 LIPID PANEL: CPT

## 2019-10-10 ENCOUNTER — OFFICE VISIT (OUTPATIENT)
Dept: CARDIOLOGY CLINIC | Age: 59
End: 2019-10-10
Payer: COMMERCIAL

## 2019-10-10 VITALS
HEART RATE: 71 BPM | BODY MASS INDEX: 28.73 KG/M2 | DIASTOLIC BLOOD PRESSURE: 60 MMHG | OXYGEN SATURATION: 98 % | SYSTOLIC BLOOD PRESSURE: 102 MMHG | HEIGHT: 69 IN | WEIGHT: 194 LBS

## 2019-10-10 DIAGNOSIS — I35.0 AORTIC STENOSIS, MODERATE: Primary | ICD-10-CM

## 2019-10-10 DIAGNOSIS — I10 ESSENTIAL HYPERTENSION: ICD-10-CM

## 2019-10-10 DIAGNOSIS — I25.118 CORONARY ARTERY DISEASE OF NATIVE ARTERY OF NATIVE HEART WITH STABLE ANGINA PECTORIS (HCC): ICD-10-CM

## 2019-10-10 DIAGNOSIS — I35.1 MODERATE AORTIC REGURGITATION: ICD-10-CM

## 2019-10-10 PROCEDURE — 99214 OFFICE O/P EST MOD 30 MIN: CPT | Performed by: INTERNAL MEDICINE

## 2019-10-10 PROCEDURE — G8419 CALC BMI OUT NRM PARAM NOF/U: HCPCS | Performed by: INTERNAL MEDICINE

## 2019-10-10 PROCEDURE — 4004F PT TOBACCO SCREEN RCVD TLK: CPT | Performed by: INTERNAL MEDICINE

## 2019-10-10 PROCEDURE — G8598 ASA/ANTIPLAT THER USED: HCPCS | Performed by: INTERNAL MEDICINE

## 2019-10-10 PROCEDURE — G8484 FLU IMMUNIZE NO ADMIN: HCPCS | Performed by: INTERNAL MEDICINE

## 2019-10-10 PROCEDURE — G8427 DOCREV CUR MEDS BY ELIG CLIN: HCPCS | Performed by: INTERNAL MEDICINE

## 2019-10-10 PROCEDURE — 3017F COLORECTAL CA SCREEN DOC REV: CPT | Performed by: INTERNAL MEDICINE

## 2019-10-10 RX ORDER — RANITIDINE 150 MG/1
150 TABLET ORAL 2 TIMES DAILY
Qty: 180 TABLET | Refills: 3 | Status: SHIPPED | OUTPATIENT
Start: 2019-10-10 | End: 2019-10-24 | Stop reason: ALTCHOICE

## 2019-10-15 RX ORDER — CLOPIDOGREL BISULFATE 75 MG/1
TABLET ORAL
Qty: 30 TABLET | Refills: 11 | Status: SHIPPED | OUTPATIENT
Start: 2019-10-15 | End: 2020-10-05

## 2019-10-23 ENCOUNTER — TELEPHONE (OUTPATIENT)
Dept: CARDIOLOGY CLINIC | Age: 59
End: 2019-10-23

## 2019-10-24 RX ORDER — OMEPRAZOLE 20 MG/1
20 CAPSULE, DELAYED RELEASE ORAL DAILY
Qty: 30 CAPSULE | Refills: 5 | Status: SHIPPED | OUTPATIENT
Start: 2019-10-24 | End: 2020-09-08

## 2020-02-10 RX ORDER — LISINOPRIL 10 MG/1
TABLET ORAL
Qty: 30 TABLET | Refills: 6 | Status: SHIPPED | OUTPATIENT
Start: 2020-02-10 | End: 2020-09-08

## 2020-04-08 RX ORDER — METOPROLOL SUCCINATE 50 MG/1
TABLET, EXTENDED RELEASE ORAL
Qty: 30 TABLET | Refills: 11 | Status: SHIPPED | OUTPATIENT
Start: 2020-04-08 | End: 2021-04-05

## 2020-08-06 RX ORDER — ATORVASTATIN CALCIUM 80 MG/1
TABLET, FILM COATED ORAL
Qty: 30 TABLET | Refills: 2 | Status: SHIPPED | OUTPATIENT
Start: 2020-08-06 | End: 2020-11-04

## 2020-08-17 NOTE — PROGRESS NOTES
[address] Office Note  8/18/2020     Subjective:  Mr. Kimber Samuel is here today for cardiology follow up HTN, CAD, active  tobacco abuse, aortic stenosis. Reports exertional chest pain    HPI:    Today he reports that  He will have an occasional exertional chest pain, pressure/heaviness, such as walking longer distances or with working in the heat. Goes away with resting after 5-10 minutes. Pain occurs 1 episode week/spordic. Also continues to have baseline SOB from smoking especially in heat. He continues to smoke 1.5 ppd. He is currently  on disability. He continues to drink 6 pack every day as well as smoking marijuana 2-3 times per week. Otherwise Denies  edema, dizziness,  and syncope. PMH   tobacco abuse, untreated HTN, daily ETOH use who presents to [facility] 4/25/16 with complaint of chest pain  Describes it as a sharp heavy pressure sensation in the middle of his chest with SOB, and diaphoresis. It was advised to transfer to [facility] for cath but he chose to go home and come next day for procedure. Unfortunately overnight he had suffered a STEMI and was brought to cath lab 4/26/16 which revealed 80% ruptured plaque in mid left anterior descending, s/p 3.0 x 16mm Promus PREMIER drug-eluting stent. 50% mid large first diagonal branch,Severely reduced left ventricular systolic function with ejection fraction 30% with mid anterior, anteroapical as well as inferoapical akinesis. Markedly elevated left ventricular end-diastolic pressure, 36 mmHg. He reported chest pain going to rt arm mostly with exertion for last 6 months. Similar to prior heart attack. He reports not being able to work for [employer], and was let go of his job . He reports being very stressed out past due on rent and ready to be evicted. He continues to smoke but has cut back from 3 ppd to 1.5 ppd. He continues to drink 6-8 beers per day and 1-2 marijuana joints per day. Zac Talavera     He underwent a cardiac cath on 5/5/17 that showed normal coronaries. He has mild aortic stenosis  He has been taking his medications as prescribed. Review of Systems: 12 point ROS negative in all areas as listed below except as in [place]  Constitutional, EENT, Cardiovascular, pulmonary, GI, , Musculoskeletal, skin, neurological, hematological, endocrine, Psychiatric      Reviewed past medical history, social, and family history. Smoking 30 cigarettes a day  Smokes 6-8 beers per night and 2 joints of marijuana per day  Past Medical History:   Diagnosis Date    Carotid artery stenosis     Hyperlipidemia     Hypertension      Past Surgical History:   Procedure Laterality Date    CORONARY ANGIOPLASTY WITH STENT PLACEMENT      TONSILLECTOMY         Objective:   BP (!) 140/60   Pulse 75   Temp 96.4 °F (35.8 °C)   Ht 5' 9\" (1.753 m)   Wt 196 lb (88.9 kg)   SpO2 98%   BMI 28.94 kg/m²     Wt Readings from Last 3 Encounters:   08/18/20 196 lb (88.9 kg)   10/10/19 194 lb (88 kg)   04/09/19 196 lb (88.9 kg)       Physical Exam:  General: No Respiratory distress, appears well developed and well nourished. Eyes:  Sclera nonicteric  Nose/Sinuses:  negative findings: nose shows no deformity, asymmetry, or inflammation, nasal mucosa normal, septum midline with no perforation or bleeding  Back:  no pain to palpation  Joint:  no active joint inflammation  Musculoskeletal:  negative  Skin:  Warm and dry  Neck:  Negative for JVD and Carotid Bruits.    Chest:  Rhoncus breath sounds bilat, respiration easy  Cardiovascular:  RRR, S1S2 normal, 1/6 GALINA at base of heart  Abdomen:  Soft normal liver and spleen  Extremities:   No edema, clubbing, cyanosis,  Neuro: intact    Medications:   Outpatient Encounter Medications as of 8/18/2020   Medication Sig Dispense Refill    atorvastatin (LIPITOR) 80 MG tablet TAKE ONE TABLET BY MOUTH EVERY NIGHT AT BEDTIME 30 tablet 2    metoprolol succinate (TOPROL XL) 50 MG extended release tablet TAKE ONE TABLET BY MOUTH DAILY 30 tablet 11    lisinopril (PRINIVIL;ZESTRIL) 10 MG tablet TAKE ONE TABLET BY MOUTH DAILY 30 tablet 6    omeprazole (PRILOSEC) 20 MG delayed release capsule Take 1 capsule by mouth daily 30 capsule 5    clopidogrel (PLAVIX) 75 MG tablet TAKE ONE TABLET BY MOUTH DAILY 30 tablet 11    indomethacin (INDOCIN) 50 MG capsule Take 1 capsule by mouth as needed for Pain 60 capsule 0    colchicine (COLCRYS) 0.6 MG tablet prn 15 tablet 0    nitroGLYCERIN (NITROSTAT) 0.4 MG SL tablet Place 1 tablet under the tongue every 5 minutes as needed for Chest pain 25 tablet 3    aspirin 81 MG chewable tablet Take 1 tablet by mouth daily 30 tablet 3     No facility-administered encounter medications on file as of 8/18/2020. LIPID:   Lab Results   Component Value Date    CHOL 123 03/15/2018    CHOL 136 05/05/2017    CHOL 189 04/26/2016     Lab Results   Component Value Date    TRIG 286 03/15/2018    TRIG 610 (H) 05/05/2017    TRIG 187 (H) 04/26/2016     Lab Results   Component Value Date    HDL 42 09/24/2019    HDL 44 03/15/2018    HDL 42 10/13/2017     Lab Results   Component Value Date    LDLCALC 18 09/24/2019    LDLCALC 22 03/15/2018    LDLCALC 30 10/13/2017     Lab Results   Component Value Date    LABVLDL 53 09/24/2019    LABVLDL 47 10/13/2017    LABVLDL see below 05/05/2017    VLDL 57 03/15/2018     No results found for: CHOLHDLRATIO  PT/INR: No results for input(s): PROTIME, INR in the last 72 hours. A1C: No results found for: LABA1C  BNP:  No results for input(s): BNP in the last 72 hours.     IMAGING:   ECHO 9/24/19  Summary   Left ventricular systolic function is normal with a visually estimated   ejection fraction of 55%.   The left ventricle is normal in size with mild concentric hypertrophy.   No regional wall motion abnormalities are noted.   Normal left ventricular diastolic function.   Aortic valve area calculated at 1.25 cm2 with a maximum velocity of 3.39   m/s, a maximum pressure gradient of 46 mmHg and a mean pressure gradient of   23 mmHg, consistent with moderate aortic stenosis.   There is moderate eccentric aortic valve regurgitation.   The aortic valve is bicuspid in appearance.   The aortic root is mildly dilated at 3.8 cm.   A coarctation or otherwise narrow descending thoracic aorta cannot be   excluded. Suggest CT chest to further evaluate.   Mild pulmonic regurgitation. ECHO 8/6/18  Summary   Ejection fraction is visually estimated to be 55 %.   There is mild concentric left ventricular hypertrophy.   Left ventricle size is normal.   Normal left ventricular diastolic filling pressure.   The aortic valve is thickened/calcified with decreased leaflet mobility   consistent with aortic stenosis.   By Doppler examination this is c/w mild aortic stenosis.   Mild aortic regurgitation.   Aortic valve is bicuspid    Limited ECHO 8/1/16  Summary   This is a limited study for follow-up.   Compared to echo of 4/25/2016, no significant change in degree of aortic   stenosis, LVEF appears a little more hyperdynamic.   Ejection fraction is visually estimated to be 65 %. Overall left ventricular   function is normal.   No regional wall motion abnormalities are noted.   AV: mild-moderate aortic stenosis. Mild-moderate aortic regurgitation is present. Echo doppler 4.25.16  Summary  Normal left ventricle size and systolic function with an estimated ejection  fraction of 55%. No regional wall motion abnormalities are seen. There is mild concentric left ventricular hypertrophy. Diastolic filling parameters suggest diastolic dysfunction. Mitral valve is structurally normal.  Mitral valve leaflets appear to open adequately. Mild mitral regurgitation is present. The aortic valve is thickened/calcified with decreased leaflet mobility  consistent with aortic stenosis.   The aortic valve area is calculated at 1.32 cm2 with a maximum transaortic  velocity of 3.80 m/sec, a maximum pressure gradient of 58 mmHg and a mean  pressure gradient of 26 mmHg. This is c/w moderate aortic stenosis. Moderate aortic regurgitation is present  CATH 4/26/16  IMPRESSION:  1. A 80% ruptured plaque in mid left anterior descending, s/p 3.0 x 16mm Promus PREMIER  drug-eluting stent. 2. A 50% mid large first diagonal branch. 3. Severely reduced left ventricular systolic function with ejection  fraction 30% with mid anterior, anteroapical as well as inferoapical akinesis. 4. Markedly elevated left ventricular end-diastolic pressure, 36 mmHg. Cath 5/5/17  Findings:  1. No significant coronary artery disease noted within the major epicardial arteries. ~patent LAD stent  2. Normal LVEF  3. Mild aortic valve stenosis   ~23mmHg peak to peak gradient      Conclusion/plan of care:  1. Medical management  2. Stop smoking as probably has endothelial dysfunciton    Assessment:  Encounter Diagnoses   Name Primary?  Coronary artery disease involving native coronary artery of native heart with unstable angina pectoris ([address] Yes    Essential hypertension     Nonrheumatic aortic valve stenosis     Unstable angina pectoris (HCC)        Herlinda Anthony was seen today for 6 month follow-up, coronary artery disease, hypertension, cardiac valve problem, now with chest pain Diagnoses and all orders for this visit:    Aortic valve moderate stenosis moderate aortic regurgitation on  echo doppler September 2020    Coronary artery disease involving native coronary artery of native heart with  stable  angina pectoris  Last lipids 3/15/18 I personally reviewed labs results in epic and discussed with patient      Essential hypertension    Tobacco abuse 11/2 pack per day    Plan:  1. Will order Lexiscan stress test to evaluate for new blockage  2. Smoking cessation discussed He is not trying hard to quit help offered   3. meds reviewed and refilled as warranted  4. Follow up with me in 6 months   5.  1-800 quit now advised for free smoking cessation program  6.  Will recheck ECHO 9/2020  7. Will check fasting CMP, lipids    QUALITY MEASURES  1. Tobacco Cessation Counseling: Yes  2. Retake of BP if >140/90:   NA  3. Documentation to PCP/referring for new patient:  Sent to PCP at close of office visit  4. CAD patient on anti-platelet: Yes  5. CAD patient on STATIN therapy:  Yes  6. Patient with CHF and aFib on anticoagulation:  NA     This note was scribed in the presence of  Clara Christy MD by Jalaine Heimlich, RN  I, Dr. Clara Christy, personally performed the services described in this documentation, as scribed by the above signed scribe in my presence. It is both accurate and complete to my knowledge. I agree with the details independently gathered by the clinical support staff, while the remaining scribed note accurately describes my personal service to the patient.       Kassidy Russell MD 8/18/2020 1:39 PM

## 2020-08-18 ENCOUNTER — OFFICE VISIT (OUTPATIENT)
Dept: CARDIOLOGY CLINIC | Age: 60
End: 2020-08-18
Payer: COMMERCIAL

## 2020-08-18 VITALS
WEIGHT: 196 LBS | HEIGHT: 69 IN | TEMPERATURE: 96.4 F | BODY MASS INDEX: 29.03 KG/M2 | HEART RATE: 75 BPM | OXYGEN SATURATION: 98 % | SYSTOLIC BLOOD PRESSURE: 140 MMHG | DIASTOLIC BLOOD PRESSURE: 60 MMHG

## 2020-08-18 PROCEDURE — 3017F COLORECTAL CA SCREEN DOC REV: CPT | Performed by: INTERNAL MEDICINE

## 2020-08-18 PROCEDURE — G8419 CALC BMI OUT NRM PARAM NOF/U: HCPCS | Performed by: INTERNAL MEDICINE

## 2020-08-18 PROCEDURE — 99214 OFFICE O/P EST MOD 30 MIN: CPT | Performed by: INTERNAL MEDICINE

## 2020-08-18 PROCEDURE — G8427 DOCREV CUR MEDS BY ELIG CLIN: HCPCS | Performed by: INTERNAL MEDICINE

## 2020-08-18 PROCEDURE — 4004F PT TOBACCO SCREEN RCVD TLK: CPT | Performed by: INTERNAL MEDICINE

## 2020-08-18 NOTE — PATIENT INSTRUCTIONS
Plan:  1. Will order Lexiscan stress test to evaluate for new blockage  2. Smoking cessation discussed He is not trying hard to quit help offered   3. meds reviewed and refilled as warranted  4. Follow up with me in 6 months   5.  1-800 quit now advised for free smoking cessation program  6. Will recheck ECHO 9/2020  7.  Will check fasting CMP, lipids

## 2020-09-08 RX ORDER — OMEPRAZOLE 20 MG/1
CAPSULE, DELAYED RELEASE ORAL
Qty: 30 CAPSULE | Refills: 5 | Status: SHIPPED | OUTPATIENT
Start: 2020-09-08 | End: 2021-03-05

## 2020-09-08 RX ORDER — LISINOPRIL 10 MG/1
TABLET ORAL
Qty: 30 TABLET | Refills: 5 | Status: SHIPPED | OUTPATIENT
Start: 2020-09-08 | End: 2021-03-05

## 2020-10-05 RX ORDER — CLOPIDOGREL BISULFATE 75 MG/1
TABLET ORAL
Qty: 30 TABLET | Refills: 11 | Status: SHIPPED | OUTPATIENT
Start: 2020-10-05 | End: 2021-05-10 | Stop reason: SDUPTHER

## 2020-11-04 RX ORDER — ATORVASTATIN CALCIUM 80 MG/1
TABLET, FILM COATED ORAL
Qty: 90 TABLET | Refills: 1 | Status: SHIPPED | OUTPATIENT
Start: 2020-11-04 | End: 2021-05-03

## 2020-11-04 NOTE — TELEPHONE ENCOUNTER
He needs to get his blood test as ordered last visit  Order is in epic all he has to do is go to [facility] lab and get it done in fasting state.

## 2021-03-05 RX ORDER — OMEPRAZOLE 20 MG/1
CAPSULE, DELAYED RELEASE ORAL
Qty: 30 CAPSULE | Refills: 4 | Status: SHIPPED | OUTPATIENT
Start: 2021-03-05 | End: 2021-08-02

## 2021-03-05 RX ORDER — LISINOPRIL 10 MG/1
TABLET ORAL
Qty: 90 TABLET | Refills: 1 | Status: SHIPPED | OUTPATIENT
Start: 2021-03-05 | End: 2021-05-10 | Stop reason: SDUPTHER

## 2021-04-05 RX ORDER — METOPROLOL SUCCINATE 50 MG/1
TABLET, EXTENDED RELEASE ORAL
Qty: 30 TABLET | Refills: 3 | Status: SHIPPED | OUTPATIENT
Start: 2021-04-05 | End: 2021-05-10 | Stop reason: SDUPTHER

## 2021-05-03 RX ORDER — ATORVASTATIN CALCIUM 80 MG/1
TABLET, FILM COATED ORAL
Qty: 90 TABLET | Refills: 3 | Status: SHIPPED | OUTPATIENT
Start: 2021-05-03 | End: 2022-05-02 | Stop reason: SDUPTHER

## 2021-05-10 ENCOUNTER — OFFICE VISIT (OUTPATIENT)
Dept: CARDIOLOGY CLINIC | Age: 61
End: 2021-05-10
Payer: COMMERCIAL

## 2021-05-10 VITALS
SYSTOLIC BLOOD PRESSURE: 158 MMHG | DIASTOLIC BLOOD PRESSURE: 70 MMHG | OXYGEN SATURATION: 98 % | WEIGHT: 198.12 LBS | HEART RATE: 72 BPM | HEIGHT: 69 IN | BODY MASS INDEX: 29.35 KG/M2 | TEMPERATURE: 95.8 F

## 2021-05-10 DIAGNOSIS — R06.02 SOB (SHORTNESS OF BREATH): ICD-10-CM

## 2021-05-10 DIAGNOSIS — I25.798 CORONARY ARTERY DISEASE OF OTHER BYPASS GRAFT WITH STABLE ANGINA PECTORIS (HCC): Primary | ICD-10-CM

## 2021-05-10 DIAGNOSIS — I10 ESSENTIAL HYPERTENSION: ICD-10-CM

## 2021-05-10 PROCEDURE — G8427 DOCREV CUR MEDS BY ELIG CLIN: HCPCS | Performed by: INTERNAL MEDICINE

## 2021-05-10 PROCEDURE — 4004F PT TOBACCO SCREEN RCVD TLK: CPT | Performed by: INTERNAL MEDICINE

## 2021-05-10 PROCEDURE — G8419 CALC BMI OUT NRM PARAM NOF/U: HCPCS | Performed by: INTERNAL MEDICINE

## 2021-05-10 PROCEDURE — 99214 OFFICE O/P EST MOD 30 MIN: CPT | Performed by: INTERNAL MEDICINE

## 2021-05-10 PROCEDURE — 3017F COLORECTAL CA SCREEN DOC REV: CPT | Performed by: INTERNAL MEDICINE

## 2021-05-10 RX ORDER — LISINOPRIL 20 MG/1
20 TABLET ORAL DAILY
Qty: 90 TABLET | Refills: 3 | Status: SHIPPED | OUTPATIENT
Start: 2021-05-10 | End: 2022-06-09 | Stop reason: SDUPTHER

## 2021-05-10 RX ORDER — LISINOPRIL 10 MG/1
TABLET ORAL
Qty: 90 TABLET | Refills: 3 | Status: SHIPPED
Start: 2021-05-10 | End: 2021-05-10 | Stop reason: DRUGHIGH

## 2021-05-10 RX ORDER — METOPROLOL SUCCINATE 50 MG/1
TABLET, EXTENDED RELEASE ORAL
Qty: 90 TABLET | Refills: 3 | Status: SHIPPED | OUTPATIENT
Start: 2021-05-10 | End: 2022-06-09 | Stop reason: SDUPTHER

## 2021-05-10 RX ORDER — CLOPIDOGREL BISULFATE 75 MG/1
TABLET ORAL
Qty: 90 TABLET | Refills: 3 | Status: SHIPPED | OUTPATIENT
Start: 2021-05-10 | End: 2022-06-09 | Stop reason: SDUPTHER

## 2021-05-10 NOTE — PATIENT INSTRUCTIONS
Plan:  1. Meds reviewed. 2. Will check CBC, CMP, non-fasting lipids, and TSH today   3. Referral to [facility] pulmonary team for formal lung evaluation.   4. Follow up with me in 6 months

## 2021-05-10 NOTE — PROGRESS NOTES
[facility] Office Note  5/10/2021     Subjective:  Mr. Danica Vasquez is here today for cardiology follow up HTN, CAD, active  tobacco abuse 1.5ppd inspite of advice against it, aortic stenosis. [place]:    Today he reports CP approximately 1-2 times weekly. Pain usually comes after walking to the mailbox 100+ feet. He does have intermittent SOB when walking and believes this is the cause of CP. He continues to smoke 1.5 PPD. Chest pain does not happen every time it walks and is not consistent. He has not had blood work as recommended last time says he has no transportation. Says he has no money for [employer]  Who charge nothing or a couple of dollars   PMH   tobacco abuse, untreated HTN, daily ETOH use who presents to [facility] with complaint of chest pain  Describes it as a sharp heavy pressure sensation in the middle of his chest with SOB, and diaphoresis. It was advised to transfer to [facility] for cath but he chose to go home and come next day for procedure. Unfortunately overnight he had suffered a STEMI and was brought to cath lab 4/26/16 which revealed 80% ruptured plaque in mid left anterior descending, s/p 3.0 x 16mm Promus PREMIER drug-eluting stent. 50% mid large first diagonal branch,Severely reduced left ventricular systolic function with ejection fraction 30% with mid anterior, anteroapical as well as inferoapical akinesis. Markedly elevated left ventricular end-diastolic pressure, 36 mmHg. He reported chest pain going to rt arm mostly with exertion for last 6 months. Similar to prior heart attack. He reports not being able to work for [employer], and was let go of his job . He reports being very stressed out past due on rent and ready to be evicted. He continues to smoke but has cut back from 3 ppd to 1.5 ppd. He continues to drink 6-8 beers per day and 1-2 marijuana joints per day. Ezio Irwin     He underwent a cardiac cath on 5/5/17 that showed normal coronaries. He has mild aortic stenosis  He has been taking his medications as prescribed. Review of Systems: 12 point ROS negative in all areas as listed below except as in [place]  Constitutional, EENT, GI, , Musculoskeletal, skin, neurological, hematological, endocrine, Psychiatric      Reviewed past medical history, social, and family history. Smoking 30 cigarettes a day  Smokes 6-8 beers per night and 2 joints of marijuana per day  Mother  in [de-identified] dementia   Father  MI 63's  Past Medical History:   Diagnosis Date    Carotid artery stenosis     Hyperlipidemia     Hypertension      Past Surgical History:   Procedure Laterality Date    CORONARY ANGIOPLASTY WITH STENT PLACEMENT      TONSILLECTOMY         Objective:   BP (!) 158/70   Pulse 72   Temp 95.8 °F (35.4 °C)   Ht 5' 9\" (1.753 m)   Wt 198 lb 1.9 oz (89.9 kg)   SpO2 98%   BMI 29.26 kg/m²     Wt Readings from Last 3 Encounters:   05/10/21 198 lb 1.9 oz (89.9 kg)   20 196 lb (88.9 kg)   10/10/19 194 lb (88 kg)       Physical Exam:  General: No Respiratory distress, appears well developed and well nourished. Eyes:  Sclera nonicteric  Nose/Sinuses:  negative findings: nose shows no deformity, asymmetry, or inflammation, nasal mucosa normal, septum midline with no perforation or bleeding  Back:  no pain to palpation  Joint:  no active joint inflammation  Musculoskeletal:  negative  Skin:  Warm and dry  Neck:  Negative for JVD and Carotid Bruits.    Chest:  Rhoncus breath sounds bilat, respiration easy  Cardiovascular:  RRR, S1S2 normal, 1/6 GALINA at base of heart  Abdomen:  Soft normal liver and spleen  Extremities:   No edema, clubbing, cyanosis,  Neuro: intact    Medications:   Outpatient Encounter Medications as of 5/10/2021   Medication Sig Dispense Refill    metoprolol succinate (TOPROL XL) 50 MG extended release tablet TAKE ONE TABLET BY MOUTH DAILY 90 tablet 3    lisinopril (PRINIVIL;ZESTRIL) 10 MG tablet TAKE ONE TABLET BY hours.    IMAGING:   I have reviewed the following tests and documented in this encounter as follows:   Discussed with patient. ECHO 9/24/19  Summary   Left ventricular systolic function is normal with a visually estimated   ejection fraction of 55%.   The left ventricle is normal in size with mild concentric hypertrophy.   No regional wall motion abnormalities are noted.   Normal left ventricular diastolic function.   Aortic valve area calculated at 1.25 cm2 with a maximum velocity of 3.39   m/s, a maximum pressure gradient of 46 mmHg and a mean pressure gradient of   23 mmHg, consistent with moderate aortic stenosis.   There is moderate eccentric aortic valve regurgitation.   The aortic valve is bicuspid in appearance.   The aortic root is mildly dilated at 3.8 cm.   A coarctation or otherwise narrow descending thoracic aorta cannot be   excluded. Suggest CT chest to further evaluate.   Mild pulmonic regurgitation. ECHO 8/6/18  Summary   Ejection fraction is visually estimated to be 55 %.   There is mild concentric left ventricular hypertrophy.   Left ventricle size is normal.   Normal left ventricular diastolic filling pressure.   The aortic valve is thickened/calcified with decreased leaflet mobility   consistent with aortic stenosis.   By Doppler examination this is c/w mild aortic stenosis.   Mild aortic regurgitation.   Aortic valve is bicuspid    Limited ECHO 8/1/16  Summary   This is a limited study for follow-up.   Compared to echo of 4/25/2016, no significant change in degree of aortic   stenosis, LVEF appears a little more hyperdynamic.   Ejection fraction is visually estimated to be 65 %. Overall left ventricular   function is normal.   No regional wall motion abnormalities are noted.   AV: mild-moderate aortic stenosis. Mild-moderate aortic regurgitation is present. Echo doppler 4.25.16  Summary  Normal left ventricle size and systolic function with an estimated ejection  fraction of 55%.  No regional wall motion abnormalities are seen. There is mild concentric left ventricular hypertrophy. Diastolic filling parameters suggest diastolic dysfunction. Mitral valve is structurally normal.  Mitral valve leaflets appear to open adequately. Mild mitral regurgitation is present. The aortic valve is thickened/calcified with decreased leaflet mobility  consistent with aortic stenosis. The aortic valve area is calculated at 1.32 cm2 with a maximum transaortic  velocity of 3.80 m/sec, a maximum pressure gradient of 58 mmHg and a mean  pressure gradient of 26 mmHg. This is c/w moderate aortic stenosis. Moderate aortic regurgitation is present  CATH 4/26/16  IMPRESSION:  1. A 80% ruptured plaque in mid left anterior descending, s/p 3.0 x 16mm Promus PREMIER  drug-eluting stent. 2. A 50% mid large first diagonal branch. 3. Severely reduced left ventricular systolic function with ejection  fraction 30% with mid anterior, anteroapical as well as inferoapical akinesis. 4. Markedly elevated left ventricular end-diastolic pressure, 36 mmHg. Cath 5/5/17  Findings:  1. No significant coronary artery disease noted within the major epicardial arteries. ~patent LAD stent  2. Normal LVEF  3. Mild aortic valve stenosis   ~23mmHg peak to peak gradient      Conclusion/plan of care:  1. Medical management  2. Stop smoking as probably has endothelial dysfunciton    Assessment:  Encounter Diagnoses   Name Primary?     Coronary artery disease of other bypass graft with stable angina pectoris ([address] Yes    Essential hypertension     SOB (shortness of breath)    medical non compliance  Substance abuse    Stewmartin Medina was seen today for 6 month follow-up, coronary artery disease, hypertension, cardiac valve problem and  chest pain Diagnoses and all orders for this visit:    Aortic valve moderate stenosis moderate aortic regurgitation on  echo doppler September 2019    Coronary artery disease involving native coronary artery of native heart with  stable  angina pectoris not sure if his chest pain is angina or his lungs. He continues to smoke   Last lipids 9/24/19 I personally reviewed labs results in epic and discussed with patient      Essential hypertension not controlled     Tobacco abuse 11/2 pack per day    Plan:  1. Meds reviewed. 2. Will check CBC, CMP, non-fasting lipids, and TSH today   3. Referral to [place] pulmonary team for formal lung evaluation. 4. Follow up with me in 6 months   5. Advised to cut back on smoking and instead use those funds for health care. 6. Increase lisinopril to 20 mg daily    This note was scribed in the presence of Ángel Oliver MD by Florida Goodwin RN        QUALITY MEASURES  1. Tobacco Cessation Counseling: Yes  2. Retake of BP if >140/90:   NA  3. Documentation to PCP/referring for new patient:  Sent to PCP at close of office visit  4. CAD patient on anti-platelet: Yes  5. CAD patient on STATIN therapy:  Yes  6. Patient with CHF and aFib on anticoagulation:  NA     I, Dr. Chin Chavez, personally performed the services described in this documentation, as scribed by the above signed scribe in my presence. It is both accurate and complete to my knowledge. I agree with the details independently gathered by the clinical support staff, while the remaining scribed note accurately describes my personal service to the patient.           [address] [place], MD 5/10/2021 3:36 PM

## 2021-08-03 RX ORDER — OMEPRAZOLE 20 MG/1
CAPSULE, DELAYED RELEASE ORAL
Qty: 30 CAPSULE | Refills: 5 | Status: SHIPPED | OUTPATIENT
Start: 2021-08-03 | End: 2022-01-29

## 2022-01-29 RX ORDER — OMEPRAZOLE 20 MG/1
CAPSULE, DELAYED RELEASE ORAL
Qty: 30 CAPSULE | Refills: 5 | Status: SHIPPED | OUTPATIENT
Start: 2022-01-29 | End: 2022-08-02

## 2022-04-28 RX ORDER — ATORVASTATIN CALCIUM 80 MG/1
TABLET, FILM COATED ORAL
Qty: 90 TABLET | Refills: 3 | OUTPATIENT
Start: 2022-04-28

## 2022-05-02 DIAGNOSIS — E78.2 MODERATE MIXED HYPERLIPIDEMIA NOT REQUIRING STATIN THERAPY: Primary | ICD-10-CM

## 2022-05-02 DIAGNOSIS — I25.798 CORONARY ARTERY DISEASE OF OTHER BYPASS GRAFT WITH STABLE ANGINA PECTORIS (HCC): ICD-10-CM

## 2022-05-02 DIAGNOSIS — I10 PRIMARY HYPERTENSION: ICD-10-CM

## 2022-05-03 RX ORDER — ATORVASTATIN CALCIUM 80 MG/1
80 TABLET, FILM COATED ORAL NIGHTLY
Qty: 90 TABLET | Refills: 0 | Status: SHIPPED | OUTPATIENT
Start: 2022-05-03 | End: 2022-06-09 | Stop reason: SDUPTHER

## 2022-05-28 DIAGNOSIS — E78.2 MODERATE MIXED HYPERLIPIDEMIA NOT REQUIRING STATIN THERAPY: Primary | ICD-10-CM

## 2022-05-28 DIAGNOSIS — I25.798 CORONARY ARTERY DISEASE OF OTHER BYPASS GRAFT WITH STABLE ANGINA PECTORIS (HCC): ICD-10-CM

## 2022-05-28 DIAGNOSIS — I10 PRIMARY HYPERTENSION: ICD-10-CM

## 2022-05-31 RX ORDER — LISINOPRIL 10 MG/1
TABLET ORAL
Qty: 90 TABLET | Refills: 3 | OUTPATIENT
Start: 2022-05-31

## 2022-06-07 ENCOUNTER — HOSPITAL ENCOUNTER (OUTPATIENT)
Age: 62
Discharge: HOME | End: 2022-06-07
Payer: COMMERCIAL

## 2022-06-07 DIAGNOSIS — I10 PRIMARY HYPERTENSION: ICD-10-CM

## 2022-06-07 DIAGNOSIS — I25.798 CORONARY ARTERY DISEASE OF OTHER BYPASS GRAFT WITH STABLE ANGINA PECTORIS (HCC): ICD-10-CM

## 2022-06-07 DIAGNOSIS — E78.2 MODERATE MIXED HYPERLIPIDEMIA NOT REQUIRING STATIN THERAPY: ICD-10-CM

## 2022-06-07 LAB
A/G RATIO: 1.8 (ref 1.1–2.2)
ALBUMIN SERPL-MCNC: 4.6 G/DL (ref 3.4–5)
ALP BLD-CCNC: 59 U/L (ref 40–129)
ALT SERPL-CCNC: 27 U/L (ref 10–40)
ANION GAP SERPL CALCULATED.3IONS-SCNC: 16 MMOL/L (ref 3–16)
AST SERPL-CCNC: 19 U/L (ref 15–37)
BASOPHILS ABSOLUTE: 0.1 K/UL (ref 0–0.2)
BASOPHILS RELATIVE PERCENT: 1.2 %
BILIRUB SERPL-MCNC: 0.5 MG/DL (ref 0–1)
BUN BLDV-MCNC: 10 MG/DL (ref 7–20)
CALCIUM SERPL-MCNC: 9.5 MG/DL (ref 8.3–10.6)
CHLORIDE BLD-SCNC: 100 MMOL/L (ref 99–110)
CO2: 23 MMOL/L (ref 21–32)
CREAT SERPL-MCNC: 1 MG/DL (ref 0.9–1.3)
EOSINOPHILS ABSOLUTE: 0.3 K/UL (ref 0–0.6)
EOSINOPHILS RELATIVE PERCENT: 3 %
GFR AFRICAN AMERICAN: >60
GFR NON-AFRICAN AMERICAN: >60
GLUCOSE BLD-MCNC: 94 MG/DL (ref 70–99)
HCT VFR BLD CALC: 43.6 % (ref 40.5–52.5)
HEMOGLOBIN: 15.4 G/DL (ref 13.5–17.5)
LYMPHOCYTES ABSOLUTE: 1.6 K/UL (ref 1–5.1)
LYMPHOCYTES RELATIVE PERCENT: 18.4 %
MCH RBC QN AUTO: 31.1 PG (ref 26–34)
MCHC RBC AUTO-ENTMCNC: 35.3 G/DL (ref 31–36)
MCV RBC AUTO: 88.1 FL (ref 80–100)
MONOCYTES ABSOLUTE: 0.8 K/UL (ref 0–1.3)
MONOCYTES RELATIVE PERCENT: 8.7 %
NEUTROPHILS ABSOLUTE: 6 K/UL (ref 1.7–7.7)
NEUTROPHILS RELATIVE PERCENT: 68.7 %
PDW BLD-RTO: 12.8 % (ref 12.4–15.4)
PLATELET # BLD: 210 K/UL (ref 135–450)
PMV BLD AUTO: 7.7 FL (ref 5–10.5)
POTASSIUM SERPL-SCNC: 3.9 MMOL/L (ref 3.5–5.1)
RBC # BLD: 4.95 M/UL (ref 4.2–5.9)
SODIUM BLD-SCNC: 139 MMOL/L (ref 136–145)
TOTAL PROTEIN: 7.2 G/DL (ref 6.4–8.2)
TSH REFLEX FT4: 1.43 UIU/ML (ref 0.27–4.2)
WBC # BLD: 8.7 K/UL (ref 4–11)

## 2022-06-07 PROCEDURE — 84443 ASSAY THYROID STIM HORMONE: CPT

## 2022-06-07 PROCEDURE — 85025 COMPLETE CBC W/AUTO DIFF WBC: CPT

## 2022-06-07 PROCEDURE — 36415 COLL VENOUS BLD VENIPUNCTURE: CPT

## 2022-06-07 PROCEDURE — 80061 LIPID PANEL: CPT

## 2022-06-07 PROCEDURE — 80053 COMPREHEN METABOLIC PANEL: CPT

## 2022-06-08 DIAGNOSIS — I10 PRIMARY HYPERTENSION: ICD-10-CM

## 2022-06-08 DIAGNOSIS — E78.2 MODERATE MIXED HYPERLIPIDEMIA NOT REQUIRING STATIN THERAPY: ICD-10-CM

## 2022-06-08 DIAGNOSIS — I25.798 CORONARY ARTERY DISEASE OF OTHER BYPASS GRAFT WITH STABLE ANGINA PECTORIS (HCC): ICD-10-CM

## 2022-06-08 LAB
CHOLESTEROL, FASTING: 132 MG/DL (ref 0–199)
HDLC SERPL-MCNC: 37 MG/DL (ref 40–60)
LDL CHOLESTEROL CALCULATED: ABNORMAL MG/DL
LDL CHOLESTEROL DIRECT: 41 MG/DL
TRIGLYCERIDE, FASTING: 393 MG/DL (ref 0–150)
VLDLC SERPL CALC-MCNC: ABNORMAL MG/DL

## 2022-06-08 RX ORDER — LISINOPRIL 20 MG/1
20 TABLET ORAL DAILY
Qty: 90 TABLET | Refills: 0 | Status: CANCELLED | OUTPATIENT
Start: 2022-06-08

## 2022-06-09 ENCOUNTER — OFFICE VISIT (OUTPATIENT)
Dept: CARDIOLOGY CLINIC | Age: 62
End: 2022-06-09
Payer: COMMERCIAL

## 2022-06-09 VITALS
OXYGEN SATURATION: 95 % | HEIGHT: 70 IN | BODY MASS INDEX: 27.92 KG/M2 | WEIGHT: 195 LBS | SYSTOLIC BLOOD PRESSURE: 172 MMHG | DIASTOLIC BLOOD PRESSURE: 66 MMHG | HEART RATE: 68 BPM

## 2022-06-09 DIAGNOSIS — E78.2 MIXED HYPERLIPIDEMIA: ICD-10-CM

## 2022-06-09 DIAGNOSIS — I35.0 NONRHEUMATIC AORTIC VALVE STENOSIS: ICD-10-CM

## 2022-06-09 DIAGNOSIS — I25.10 CORONARY ARTERY DISEASE INVOLVING NATIVE CORONARY ARTERY OF NATIVE HEART WITHOUT ANGINA PECTORIS: Primary | ICD-10-CM

## 2022-06-09 DIAGNOSIS — I10 PRIMARY HYPERTENSION: ICD-10-CM

## 2022-06-09 PROCEDURE — G8419 CALC BMI OUT NRM PARAM NOF/U: HCPCS | Performed by: INTERNAL MEDICINE

## 2022-06-09 PROCEDURE — 4004F PT TOBACCO SCREEN RCVD TLK: CPT | Performed by: INTERNAL MEDICINE

## 2022-06-09 PROCEDURE — G8427 DOCREV CUR MEDS BY ELIG CLIN: HCPCS | Performed by: INTERNAL MEDICINE

## 2022-06-09 PROCEDURE — 99214 OFFICE O/P EST MOD 30 MIN: CPT | Performed by: INTERNAL MEDICINE

## 2022-06-09 PROCEDURE — 3017F COLORECTAL CA SCREEN DOC REV: CPT | Performed by: INTERNAL MEDICINE

## 2022-06-09 RX ORDER — LISINOPRIL 20 MG/1
20 TABLET ORAL DAILY
Qty: 90 TABLET | Refills: 3 | Status: SHIPPED | OUTPATIENT
Start: 2022-06-09 | End: 2022-06-10

## 2022-06-09 RX ORDER — ATORVASTATIN CALCIUM 80 MG/1
80 TABLET, FILM COATED ORAL NIGHTLY
Qty: 90 TABLET | Refills: 3 | Status: SHIPPED | OUTPATIENT
Start: 2022-06-09

## 2022-06-09 RX ORDER — METOPROLOL SUCCINATE 50 MG/1
TABLET, EXTENDED RELEASE ORAL
Qty: 90 TABLET | Refills: 3 | Status: SHIPPED | OUTPATIENT
Start: 2022-06-09

## 2022-06-09 RX ORDER — CLOPIDOGREL BISULFATE 75 MG/1
TABLET ORAL
Qty: 90 TABLET | Refills: 3 | Status: SHIPPED | OUTPATIENT
Start: 2022-06-09

## 2022-06-09 NOTE — PROGRESS NOTES
[facility] [place] Office Note  2022     Subjective:  Mr. Melani Stanley is here today for cardiology follow up HTN, CAD s/p PCI of the LAD, active tobacco abuse 1.5ppd (inspite of advice against it) aortic stenosis. [place]:    Today, he reports doing about the same. He has been off lisinopril for 6 days. He drinks a lot of Mt Dew. His pharmacy has been sending refill order for wrong dose of lisinopril that has been discontinued. Patient with no complaints of chest pain, SOB, palpitations, dizziness, edema, or orthopnea/PND. He has been doing mild construction for work. PMH   Tobacco abuse, untreated HTN, daily ETOH use who presented to [facility] 16 with complaint of chest pain  Describes it as a sharp heavy pressure sensation in the middle of his chest with SOB, and diaphoresis. It was advised to transfer to [facility] for cath but he chose to go home and come next day for procedure. Unfortunately overnight he had suffered a STEMI and was brought to cath lab 16 which revealed 80% ruptured plaque in mid left anterior descending, s/p 3.0 x 16mm Promus PREMIER drug-eluting stent; 50% mid large first diagonal branch; Severely reduced left ventricular systolic function with ejection fraction 30% with mid anterior, anteroapical as well as inferoapical akinesis. Markedly elevated left ventricular end-diastolic pressure, 36 mmHg. He continued to smoke but has cut back from 3 ppd to 1.5 ppd and drink 6-8 beers per day and 1-2 marijuana joints per day. Cardiac cath on 17 that showed normal coronaries. He has mild aortic stenosis        Review of Systems: 12 point ROS negative in all areas as listed below except as in [place]  Constitutional, EENT, GI, , Musculoskeletal, skin, neurological, hematological, endocrine, Psychiatric    Reviewed past medical history, social, and family history.    Smoking 30 cigarettes a day  Smokes 6-8 beers per night and 2 joints of marijuana per day  Mother  in [de-identified] dementia   Father  MI 63's  Past Medical History:   Diagnosis Date    Carotid artery stenosis     Hyperlipidemia     Hypertension      Past Surgical History:   Procedure Laterality Date    CORONARY ANGIOPLASTY WITH STENT PLACEMENT      TONSILLECTOMY         Objective:   BP (!) 172/66   Pulse 68   Ht 5' 9.5\" (1.765 m)   Wt 195 lb (88.5 kg)   SpO2 95%   BMI 28.38 kg/m²     Wt Readings from Last 3 Encounters:   22 195 lb (88.5 kg)   05/10/21 198 lb 1.9 oz (89.9 kg)   20 196 lb (88.9 kg)     /80    Physical Exam:  General: No Respiratory distress, appears well developed and well nourished. Eyes:  Sclera nonicteric  Nose/Sinuses:  negative findings: nose shows no deformity, asymmetry, or inflammation, nasal mucosa normal, septum midline with no perforation or bleeding  Back:  no pain to palpation  Joint:  no active joint inflammation  Musculoskeletal:  negative  Skin:  Warm and dry  Neck:  Negative for JVD and Carotid Bruits.    Chest:  Clear breath sounds bilat, respiration easy  Cardiovascular:  RRR, S1S2 normal,  1/6 GALINA at base of heart  Abdomen:  Soft normal liver and spleen  Extremities:   No edema, clubbing, cyanosis,  Neuro: intact    Medications:   Outpatient Encounter Medications as of 2022   Medication Sig Dispense Refill    lisinopril (PRINIVIL) 20 MG tablet Take 1 tablet by mouth daily 90 tablet 3    atorvastatin (LIPITOR) 80 MG tablet Take 1 tablet by mouth nightly 90 tablet 3    clopidogrel (PLAVIX) 75 MG tablet TAKE ONE TABLET BY MOUTH DAILY 90 tablet 3    metoprolol succinate (TOPROL XL) 50 MG extended release tablet TAKE ONE TABLET BY MOUTH DAILY 90 tablet 3    omeprazole (PRILOSEC) 20 MG delayed release capsule TAKE ONE CAPSULE BY MOUTH DAILY 30 capsule 5    nitroGLYCERIN (NITROSTAT) 0.4 MG SL tablet Place 1 tablet under the tongue every 5 minutes as needed for Chest pain 25 tablet 3    aspirin 81 MG chewable tablet Take 1 tablet by mouth daily 30 tablet 3  [DISCONTINUED] atorvastatin (LIPITOR) 80 MG tablet Take 1 tablet by mouth nightly 90 tablet 0    [DISCONTINUED] metoprolol succinate (TOPROL XL) 50 MG extended release tablet TAKE ONE TABLET BY MOUTH DAILY 90 tablet 3    [DISCONTINUED] clopidogrel (PLAVIX) 75 MG tablet TAKE ONE TABLET BY MOUTH DAILY 90 tablet 3    [DISCONTINUED] lisinopril (PRINIVIL) 20 MG tablet Take 1 tablet by mouth daily 90 tablet 3    colchicine (COLCRYS) 0.6 MG tablet prn (Patient not taking: Reported on 5/10/2021) 15 tablet 0    [DISCONTINUED] indomethacin (INDOCIN) 50 MG capsule Take 1 capsule by mouth as needed for Pain (Patient not taking: Reported on 5/10/2021) 60 capsule 0     No facility-administered encounter medications on file as of 6/9/2022. LIPID:   Lab Results   Component Value Date    CHOL 123 03/15/2018    CHOL 136 05/05/2017    CHOL 189 04/26/2016     Lab Results   Component Value Date    TRIG 286 03/15/2018    TRIG 610 (H) 05/05/2017    TRIG 187 (H) 04/26/2016     Lab Results   Component Value Date    HDL 37 (L) 06/07/2022    HDL 42 09/24/2019    HDL 44 03/15/2018     Lab Results   Component Value Date    LDLCALC see below 06/07/2022    LDLCALC 18 09/24/2019    LDLCALC 22 03/15/2018     Lab Results   Component Value Date    LABVLDL see below 06/07/2022    LABVLDL 53 09/24/2019    LABVLDL 47 10/13/2017    VLDL 57 03/15/2018     No results found for: CHOLHDLRATIO  PT/INR: No results for input(s): PROTIME, INR in the last 72 hours. A1C: No results found for: LABA1C  BNP:  No results for input(s): BNP in the last 72 hours. IMAGING:   I have reviewed the following tests and documented in this encounter as follows:   Discussed with patient. ECHO 9/24/19  Summary  Left ventricular systolic function is normal with a visually estimated  ejection fraction of 55%. The left ventricle is normal in size with mild concentric hypertrophy. No regional wall motion abnormalities are noted.   Normal left ventricular diastolic function. Aortic valve area calculated at 1.25 cm2 with a maximum velocity of 3.39  m/s, a maximum pressure gradient of 46 mmHg and a mean pressure gradient of 23 mmHg, consistent with moderate aortic stenosis. There is moderate eccentric aortic valve regurgitation. The aortic valve is bicuspid in appearance. The aortic root is mildly dilated at 3.8 cm. A coarctation or otherwise narrow descending thoracic aorta cannot be  excluded. Suggest CT chest to further evaluate. Mild pulmonic regurgitation. ECHO 8/6/18  Summary  Ejection fraction is visually estimated to be 55 %. There is mild concentric left ventricular hypertrophy. Left ventricle size is normal.  Normal left ventricular diastolic filling pressure. The aortic valve is thickened/calcified with decreased leaflet mobility  consistent with aortic stenosis. By Doppler examination this is c/w mild aortic stenosis. Mild aortic regurgitation. Aortic valve is bicuspid    Limited ECHO 8/1/16  Summary  This is a limited study for follow-up. Compared to echo of 4/25/2016, no significant change in degree of aortic  stenosis, LVEF appears a little more hyperdynamic. Ejection fraction is visually estimated to be 65 %. Overall left ventricular  function is normal.  No regional wall motion abnormalities are noted. AV: mild-moderate aortic stenosis. Mild-moderate aortic regurgitation is present. Echo doppler 4.25.16  Summary  Normal left ventricle size and systolic function with an estimated ejection  fraction of 55%. No regional wall motion abnormalities are seen. There is mild concentric left ventricular hypertrophy. Diastolic filling parameters suggest diastolic dysfunction. Mitral valve is structurally normal.  Mitral valve leaflets appear to open adequately. Mild mitral regurgitation is present. The aortic valve is thickened/calcified with decreased leaflet mobility  consistent with aortic stenosis.   The aortic valve area is calculated at 1.32 cm2 with a maximum transaortic  velocity of 3.80 m/sec, a maximum pressure gradient of 58 mmHg and a mean  pressure gradient of 26 mmHg. This is c/w moderate aortic stenosis. Moderate aortic regurgitation is present  CATH 4/26/16  IMPRESSION:  1. A 80% ruptured plaque in mid left anterior descending, s/p 3.0 x 16mm Promus PREMIER  drug-eluting stent. 2. A 50% mid large first diagonal branch. 3. Severely reduced left ventricular systolic function with ejection  fraction 30% with mid anterior, anteroapical as well as inferoapical akinesis. 4. Markedly elevated left ventricular end-diastolic pressure, 36 mmHg. Cath 5/5/17  Findings:  1. No significant coronary artery disease noted within the major epicardial arteries. ~patent LAD stent  2. Normal LVEF  3. Mild aortic valve stenosis   ~23mmHg peak to peak gradient      Conclusion/plan of care:  1. Medical management  2. Stop smoking as probably has endothelial dysfunciton    Assessment:  Encounter Diagnoses   Name Primary?  Primary hypertension     Coronary artery disease involving native coronary artery of native heart without angina pectoris Yes    Mixed hyperlipidemia     Nonrheumatic aortic valve stenosis    h/oSubstance abuse    Melania Gomez was seen today for 1 yr follow-up, coronary artery disease, hypertension, cardiac valve problem  for this visit:    Aortic valve moderate stenosis moderate aortic regurgitation on  echo doppler September 2019    Coronary artery disease involving native coronary artery of native heart without  angina pectoris He continues to smoke     Last lipids 6/7/22, I personally reviewed labs results in epic and discussed with patient    Essential hypertension not controlled non compliance     Tobacco abuse 11/2 pack per day    Plan:  1. Meds reviewed. Patient education provided he needs to call us if he is running out meds  Keep yrly appointments call office to reschedule next visit call in Jan 2023  2. Continue current course  3. Recheck fasting labs in a year before next visit    Plan to follow up in a year or sooner as needed      QUALITY MEASURES  1. Tobacco Cessation Counseling: Yes  2. Retake of BP if >140/90:   NA  3. Documentation to PCP/referring for new patient:  Sent to PCP at close of office visit  4. CAD patient on anti-platelet: Yes  5. CAD patient on STATIN therapy:  Yes  6. Patient with CHF and aFib on anticoagulation:  NA     This note was scribed in the presence of Mago Burch MD by Mariano Ashley RN. I, Dr. Mynor Lovett, personally performed the services described in this documentation, as scribed by the above signed scribe in my presence. It is both accurate and complete to my knowledge. I agree with the details independently gathered by the clinical support staff, while the remaining scribed note accurately describes my personal service to the patient.         Mago Burch MD  [address]  [phone] [facility] office  [phone] [facility]

## 2022-06-10 RX ORDER — LISINOPRIL 20 MG/1
10 TABLET ORAL DAILY
Qty: 90 TABLET | Refills: 0
Start: 2022-06-10

## 2022-06-10 NOTE — TELEPHONE ENCOUNTER
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

## 2022-06-10 NOTE — TELEPHONE ENCOUNTER
Pt stated that at his ov with rkg that there was a discussion about the mg's of the Lisinopril. Pt stated at the time of his ov that he takes Lisinopril 20mg. Pt called [facility] and stated that he takes Lisinopril 10mg. Pt wants to know if he should split the 20mg in half or if there needs to be a new prescription written? Please advise.

## 2022-08-02 RX ORDER — OMEPRAZOLE 20 MG/1
CAPSULE, DELAYED RELEASE ORAL
Qty: 90 CAPSULE | Refills: 3 | Status: SHIPPED | OUTPATIENT
Start: 2022-08-02

## 2022-10-21 RX ORDER — COLCHICINE 0.6 MG/1
TABLET ORAL
Qty: 15 TABLET | Refills: 0 | Status: SHIPPED | OUTPATIENT
Start: 2022-10-21

## 2022-10-21 NOTE — TELEPHONE ENCOUNTER
LOV 6/9/22  Pt aware RKG is OOT until 10/24/22. Pt does not have pcp and recommended going to urgent care over the weekend if he can't wait until RKG is back. Pt agreeable.

## 2023-05-01 RX ORDER — LISINOPRIL 20 MG/1
TABLET ORAL
Qty: 90 TABLET | Refills: 0 | Status: SHIPPED | OUTPATIENT
Start: 2023-05-01

## 2023-06-08 ENCOUNTER — HOSPITAL ENCOUNTER (OUTPATIENT)
Age: 63
Discharge: HOME | End: 2023-06-08
Payer: COMMERCIAL

## 2023-06-08 DIAGNOSIS — I10 PRIMARY HYPERTENSION: ICD-10-CM

## 2023-06-08 LAB
ALBUMIN SERPL-MCNC: 4.5 G/DL (ref 3.4–5)
ALBUMIN/GLOB SERPL: 1.6 {RATIO} (ref 1.1–2.2)
ALP SERPL-CCNC: 58 U/L (ref 40–129)
ALT SERPL-CCNC: 25 U/L (ref 10–40)
ANION GAP SERPL CALCULATED.3IONS-SCNC: 16 MMOL/L (ref 3–16)
AST SERPL-CCNC: 18 U/L (ref 15–37)
BASOPHILS # BLD: 0.1 K/UL (ref 0–0.2)
BASOPHILS NFR BLD: 1.2 %
BILIRUB SERPL-MCNC: 0.5 MG/DL (ref 0–1)
BUN SERPL-MCNC: 9 MG/DL (ref 7–20)
CALCIUM SERPL-MCNC: 9.8 MG/DL (ref 8.3–10.6)
CHLORIDE SERPL-SCNC: 100 MMOL/L (ref 99–110)
CHOLEST SERPL-MCNC: 127 MG/DL (ref 0–199)
CO2 SERPL-SCNC: 23 MMOL/L (ref 21–32)
CREAT SERPL-MCNC: 1.1 MG/DL (ref 0.8–1.3)
DEPRECATED RDW RBC AUTO: 12.9 % (ref 12.4–15.4)
EOSINOPHIL # BLD: 0.2 K/UL (ref 0–0.6)
EOSINOPHIL NFR BLD: 2.3 %
GFR SERPLBLD CREATININE-BSD FMLA CKD-EPI: >60 ML/MIN/{1.73_M2}
GLUCOSE SERPL-MCNC: 99 MG/DL (ref 70–99)
HCT VFR BLD AUTO: 44.9 % (ref 40.5–52.5)
HDLC SERPL-MCNC: 37 MG/DL (ref 40–60)
HGB BLD-MCNC: 15.8 G/DL (ref 13.5–17.5)
LDLC SERPL CALC-MCNC: 36 MG/DL
LYMPHOCYTES # BLD: 1.7 K/UL (ref 1–5.1)
LYMPHOCYTES NFR BLD: 17.8 %
MCH RBC QN AUTO: 31.3 PG (ref 26–34)
MCHC RBC AUTO-ENTMCNC: 35.1 G/DL (ref 31–36)
MCV RBC AUTO: 89.3 FL (ref 80–100)
MONOCYTES # BLD: 0.9 K/UL (ref 0–1.3)
MONOCYTES NFR BLD: 9.2 %
NEUTROPHILS # BLD: 6.5 K/UL (ref 1.7–7.7)
NEUTROPHILS NFR BLD: 69.5 %
PLATELET # BLD AUTO: 227 K/UL (ref 135–450)
PMV BLD AUTO: 7.8 FL (ref 5–10.5)
POTASSIUM SERPL-SCNC: 4.8 MMOL/L (ref 3.5–5.1)
PROT SERPL-MCNC: 7.3 G/DL (ref 6.4–8.2)
RBC # BLD AUTO: 5.03 M/UL (ref 4.2–5.9)
SODIUM SERPL-SCNC: 139 MMOL/L (ref 136–145)
TRIGL SERPL-MCNC: 270 MG/DL (ref 0–150)
TSH SERPL DL<=0.005 MIU/L-ACNC: 1.13 UIU/ML (ref 0.27–4.2)
VLDLC SERPL CALC-MCNC: 54 MG/DL
WBC # BLD AUTO: 9.4 K/UL (ref 4–11)

## 2023-06-08 PROCEDURE — 80061 LIPID PANEL: CPT

## 2023-06-08 PROCEDURE — 36415 COLL VENOUS BLD VENIPUNCTURE: CPT

## 2023-06-08 PROCEDURE — 80053 COMPREHEN METABOLIC PANEL: CPT

## 2023-06-08 PROCEDURE — 85025 COMPLETE CBC W/AUTO DIFF WBC: CPT

## 2023-06-08 PROCEDURE — 84443 ASSAY THYROID STIM HORMONE: CPT

## 2023-07-24 RX ORDER — CLOPIDOGREL BISULFATE 75 MG/1
TABLET ORAL
Qty: 90 TABLET | Refills: 3 | Status: SHIPPED | OUTPATIENT
Start: 2023-07-24

## 2023-07-24 RX ORDER — METOPROLOL SUCCINATE 50 MG/1
TABLET, EXTENDED RELEASE ORAL
Qty: 90 TABLET | Refills: 3 | Status: SHIPPED | OUTPATIENT
Start: 2023-07-24

## 2023-07-24 RX ORDER — OMEPRAZOLE 20 MG/1
20 CAPSULE, DELAYED RELEASE ORAL DAILY
Qty: 90 CAPSULE | Refills: 3 | Status: SHIPPED | OUTPATIENT
Start: 2023-07-24

## 2023-07-24 RX ORDER — ATORVASTATIN CALCIUM 80 MG/1
80 TABLET, FILM COATED ORAL NIGHTLY
Qty: 90 TABLET | Refills: 3 | Status: SHIPPED | OUTPATIENT
Start: 2023-07-24

## 2024-02-16 RX ORDER — LISINOPRIL 20 MG/1
20 TABLET ORAL DAILY
Qty: 30 TABLET | Refills: 1 | Status: SHIPPED | OUTPATIENT
Start: 2024-02-16

## 2024-02-16 NOTE — TELEPHONE ENCOUNTER
Called patient and set up a yearly office appt.      HERIBERTO GOULDOT      LOV  06/09/2022  UPCOMING  04/09/2024  BMP  06/08/2023

## 2024-04-09 ENCOUNTER — OFFICE VISIT (OUTPATIENT)
Dept: CARDIOLOGY CLINIC | Age: 64
End: 2024-04-09
Payer: COMMERCIAL

## 2024-04-09 VITALS
HEIGHT: 69 IN | SYSTOLIC BLOOD PRESSURE: 130 MMHG | BODY MASS INDEX: 27.55 KG/M2 | OXYGEN SATURATION: 98 % | HEART RATE: 65 BPM | DIASTOLIC BLOOD PRESSURE: 60 MMHG | WEIGHT: 186 LBS

## 2024-04-09 DIAGNOSIS — I20.0 UNSTABLE ANGINA (HCC): Primary | ICD-10-CM

## 2024-04-09 DIAGNOSIS — Z95.5 STATUS POST INSERTION OF DRUG ELUTING CORONARY ARTERY STENT: ICD-10-CM

## 2024-04-09 DIAGNOSIS — I35.0 NONRHEUMATIC AORTIC VALVE STENOSIS: ICD-10-CM

## 2024-04-09 DIAGNOSIS — Z72.0 TOBACCO ABUSE: ICD-10-CM

## 2024-04-09 DIAGNOSIS — I10 PRIMARY HYPERTENSION: ICD-10-CM

## 2024-04-09 DIAGNOSIS — Z79.899 MEDICATION MANAGEMENT: ICD-10-CM

## 2024-04-09 PROCEDURE — G8427 DOCREV CUR MEDS BY ELIG CLIN: HCPCS | Performed by: INTERNAL MEDICINE

## 2024-04-09 PROCEDURE — G8419 CALC BMI OUT NRM PARAM NOF/U: HCPCS | Performed by: INTERNAL MEDICINE

## 2024-04-09 PROCEDURE — 3075F SYST BP GE 130 - 139MM HG: CPT | Performed by: INTERNAL MEDICINE

## 2024-04-09 PROCEDURE — 99214 OFFICE O/P EST MOD 30 MIN: CPT | Performed by: INTERNAL MEDICINE

## 2024-04-09 PROCEDURE — 93000 ELECTROCARDIOGRAM COMPLETE: CPT | Performed by: INTERNAL MEDICINE

## 2024-04-09 PROCEDURE — 3017F COLORECTAL CA SCREEN DOC REV: CPT | Performed by: INTERNAL MEDICINE

## 2024-04-09 PROCEDURE — 3078F DIAST BP <80 MM HG: CPT | Performed by: INTERNAL MEDICINE

## 2024-04-09 PROCEDURE — 4004F PT TOBACCO SCREEN RCVD TLK: CPT | Performed by: INTERNAL MEDICINE

## 2024-04-09 RX ORDER — ATORVASTATIN CALCIUM 80 MG/1
80 TABLET, FILM COATED ORAL NIGHTLY
Qty: 90 TABLET | Refills: 3 | Status: SHIPPED | OUTPATIENT
Start: 2024-04-09

## 2024-04-09 RX ORDER — COLCHICINE 0.6 MG/1
TABLET ORAL
Qty: 15 TABLET | Refills: 3 | Status: CANCELLED | OUTPATIENT
Start: 2024-04-09

## 2024-04-09 RX ORDER — LISINOPRIL 10 MG/1
10 TABLET ORAL DAILY
Qty: 90 TABLET | Refills: 3 | Status: SHIPPED | OUTPATIENT
Start: 2024-04-09

## 2024-04-09 RX ORDER — CLOPIDOGREL BISULFATE 75 MG/1
TABLET ORAL
Qty: 90 TABLET | Refills: 3 | Status: SHIPPED | OUTPATIENT
Start: 2024-04-09

## 2024-04-09 RX ORDER — NITROGLYCERIN 0.4 MG/1
0.4 TABLET SUBLINGUAL EVERY 5 MIN PRN
Qty: 25 TABLET | Refills: 3 | Status: SHIPPED | OUTPATIENT
Start: 2024-04-09

## 2024-04-09 RX ORDER — METOPROLOL SUCCINATE 50 MG/1
TABLET, EXTENDED RELEASE ORAL
Qty: 90 TABLET | Refills: 3 | Status: SHIPPED | OUTPATIENT
Start: 2024-04-09

## 2024-04-09 RX ORDER — LISINOPRIL 20 MG/1
20 TABLET ORAL DAILY
Qty: 60 TABLET | Refills: 3 | Status: CANCELLED | OUTPATIENT
Start: 2024-04-09

## 2024-04-09 NOTE — PATIENT INSTRUCTIONS
Your provider has ordered testing for further evaluation.  An order/prescription has been included in your paper work.   To schedule outpatient testing, contact Central Scheduling by calling [facility] ([phone]).     GXT or Lexiscan Myoview Stress Test instructions:   -Allow 3-4 hours for the entire test to be complete.  -Nothing to eat or drink after midnight prior to testing. May take prescribed medications in morning with sip of water.  -Hold diabetes medication and Insulin morning of testing. Bring glucose meter and glucose tablet if available.   -Do not drink or eat anything containing caffeine 24 hours prior to test. This includes coffee, decaffeinated coffee, chocolate, soda, or tea.  -No smoking for 12 hours prior to testing.     Plan:  1. Order echocardiogram ~ chest pain  2. Order Lexiscan stress myoview ~ chest pain   3. Medication reviewed. Refills as warranted.  4. EKG reviewed ~ no change from 05/2017  5. Smoking Cessation encouraged  ~ [email or web address]-[email or web address]  6. Order fasting lipids, CMP,CBC,TSH, hemoglobin A1c   7. Ok to continue lisinopril 10 mg daily  8. Follow up in 4 months or sooner based on testing.

## 2024-04-09 NOTE — PROGRESS NOTES
6-8 beers per day and 1-2 marijuana joints per day.     Cardiac cath on 17 that showed normal coronaries. He has mild aortic stenosis        Review of Systems: 12 point ROS negative in all areas as listed below except as in [place]  Constitutional, EENT, GI, , Musculoskeletal, skin, neurological, hematological, endocrine, Psychiatric    Reviewed past medical history, social, and family history.   Smoking 30 cigarettes a day  Smokes 6-8 beers per night and 2 joints of marijuana per day  Mother  in 80's dementia   Father  MI 60's  Past Medical History:   Diagnosis Date    Carotid artery stenosis     Hyperlipidemia     Hypertension      Past Surgical History:   Procedure Laterality Date    CORONARY ANGIOPLASTY WITH STENT PLACEMENT      TONSILLECTOMY         Objective:   /60   Pulse 65   Ht 1.765 m (5' 9.49\")   Wt 84.4 kg (186 lb)   SpO2 98%   BMI 27.08 kg/m²     Wt Readings from Last 3 Encounters:   24 84.4 kg (186 lb)   22 88.5 kg (195 lb)   05/10/21 89.9 kg (198 lb 1.9 oz)     /80    Physical Exam:  General: No Respiratory distress, appears well developed and well nourished.   Eyes:  Sclera nonicteric  Nose/Sinuses:  negative findings: nose shows no deformity, asymmetry, or inflammation, nasal mucosa normal, septum midline with no perforation or bleeding  Back:  no pain to palpation  Joint:  no active joint inflammation  Musculoskeletal:  negative  Skin:  Warm and dry  Neck:  Negative for JVD and Carotid Bruits.   Chest:  Clear breath sounds bilat, respiration easy  Cardiovascular:  RRR 68 BPM, S1S2 normal,  2/6 high pitched systolic murmur at apex of heart  Abdomen:  Soft normal liver and spleen  Extremities:   No edema, clubbing, cyanosis,  Neuro: intact    Medications:   Outpatient Encounter Medications as of 2024   Medication Sig Dispense Refill    nitroGLYCERIN (NITROSTAT) 0.4 MG SL tablet Place 1 tablet under the tongue every 5 minutes as needed for Chest pain 25

## 2024-04-19 RX ORDER — OMEPRAZOLE 20 MG/1
20 CAPSULE, DELAYED RELEASE ORAL DAILY
Qty: 90 CAPSULE | Refills: 1 | Status: SHIPPED | OUTPATIENT
Start: 2024-04-19

## 2025-01-13 NOTE — TELEPHONE ENCOUNTER
Received a fax for a refill request on omeprazole. However patient was last seen 4/9/24 and was told to follow up in 4 months.     If patient does not want to make a follow up he can contact PCP for refill     Left message for patient to return call

## 2025-01-15 NOTE — TELEPHONE ENCOUNTER
In 2020 echocardiogram and stress test ordered not completed   Again ordered in 2024 not completed.  He has significant aortic stenosis. He is non compliant with testing and advice   did not return for follow up as requested in April. Please have Patient set up a follow up in office and complete the tests requested twice in past before I keep refilling his meds. This is a safety issue..

## 2025-01-15 NOTE — TELEPHONE ENCOUNTER
Left message for patient to return call. Mailed letter, third attempt. Routing to provider to decide if he wants to approve or not.

## 2025-01-17 NOTE — TELEPHONE ENCOUNTER
Patient informed. Verbalized understanding.     Patient is scheduling his cardiac testing as well. Appt 2/18/25. Can you refill until then?

## 2025-02-13 NOTE — PROGRESS NOTES
[facility] Office Note  2/18/2025     Subjective:  Mr. Ponce is here today for cardiology follow up HTN, CAD s/p PCI of the LAD, active tobacco abuse 1.5ppd (inspite of advice against it) aortic stenosis.     Chief Complaint   Patient presents with    Follow-up    Hypertension    Coronary Artery Disease        [place]:       Today 2.18.25 , he is doing well. He did not complete his testing due to being in nursing home for the last 8 months. He reports occasional chest pains but is still able to do his daily activities. Patient denies current edema, shortness of breath, palpitations, dizziness or syncope. Patient is taking all cardiac medications as prescribed and tolerates them well.     PMH   Tobacco abuse, untreated HTN, daily ETOH use who presented to [facility]/25/16 with complaint of chest pain  Describes it as a sharp heavy pressure sensation in the middle of his chest with SOB, and diaphoresis.  It was advised to transfer to [facility] for cath but he chose to go home and come next day for procedure.  Unfortunately overnight he had suffered a STEMI and was brought to cath lab 4/26/16 which revealed 80% ruptured plaque in mid left anterior descending, s/p 3.0 x 16mm Promus PREMIER drug-eluting stent; 50% mid large first diagonal branch; Severely reduced left ventricular systolic function with ejection fraction 30% with mid anterior, anteroapical as well as inferoapical akinesis. Markedly elevated left ventricular end-diastolic pressure, 36 mmHg.  He continued to smoke but has cut back from 3 ppd to 1.5 ppd and drink 6-8 beers per day and 1-2 marijuana joints per day.     Cardiac cath on 5/5/17 that showed normal coronaries. He has mild aortic stenosis      OV 06/09/22,  he reports doing about the same.  He has been off lisinopril for 6 days.  He drinks a lot of Mt Dew. His pharmacy has been sending refill order for wrong dose of lisinopril that has been discontinued. He has been doing mild construction for

## 2025-02-18 ENCOUNTER — OFFICE VISIT (OUTPATIENT)
Dept: CARDIOLOGY CLINIC | Age: 65
End: 2025-02-18

## 2025-02-18 VITALS
DIASTOLIC BLOOD PRESSURE: 70 MMHG | BODY MASS INDEX: 28.44 KG/M2 | WEIGHT: 192 LBS | OXYGEN SATURATION: 96 % | HEIGHT: 69 IN | SYSTOLIC BLOOD PRESSURE: 164 MMHG | HEART RATE: 68 BPM

## 2025-02-18 DIAGNOSIS — R06.89 DECREASED BREATH SOUNDS: ICD-10-CM

## 2025-02-18 DIAGNOSIS — Z95.5 PRESENCE OF STENT IN CORONARY ARTERY IN PATIENT WITH CORONARY ARTERY DISEASE: ICD-10-CM

## 2025-02-18 DIAGNOSIS — I25.10 PRESENCE OF STENT IN CORONARY ARTERY IN PATIENT WITH CORONARY ARTERY DISEASE: ICD-10-CM

## 2025-02-18 DIAGNOSIS — Z72.0 TOBACCO ABUSE: ICD-10-CM

## 2025-02-18 DIAGNOSIS — I35.0 AORTIC VALVE STENOSIS, MODERATE: Primary | ICD-10-CM

## 2025-02-18 DIAGNOSIS — I10 PRIMARY HYPERTENSION: ICD-10-CM

## 2025-02-18 RX ORDER — ATORVASTATIN CALCIUM 80 MG/1
80 TABLET, FILM COATED ORAL NIGHTLY
Qty: 90 TABLET | Refills: 3 | Status: SHIPPED | OUTPATIENT
Start: 2025-02-18

## 2025-02-18 RX ORDER — CLOPIDOGREL BISULFATE 75 MG/1
TABLET ORAL
Qty: 90 TABLET | Refills: 3 | Status: SHIPPED | OUTPATIENT
Start: 2025-02-18

## 2025-02-18 RX ORDER — METOPROLOL SUCCINATE 50 MG/1
TABLET, EXTENDED RELEASE ORAL
Qty: 90 TABLET | Refills: 3 | Status: SHIPPED | OUTPATIENT
Start: 2025-02-18

## 2025-02-18 RX ORDER — NITROGLYCERIN 0.4 MG/1
0.4 TABLET SUBLINGUAL EVERY 5 MIN PRN
Qty: 25 TABLET | Refills: 3 | Status: SHIPPED | OUTPATIENT
Start: 2025-02-18

## 2025-02-18 RX ORDER — OMEPRAZOLE 20 MG/1
20 CAPSULE, DELAYED RELEASE ORAL DAILY
Qty: 90 CAPSULE | Refills: 3 | Status: SHIPPED | OUTPATIENT
Start: 2025-02-18

## 2025-02-18 RX ORDER — LISINOPRIL 20 MG/1
20 TABLET ORAL DAILY
Qty: 90 TABLET | Refills: 3 | Status: SHIPPED | OUTPATIENT
Start: 2025-02-18

## 2025-02-18 NOTE — PATIENT INSTRUCTIONS
Medications reviewed in office. Medications refilled as warranted  Labs reviewed in epic and discussed with patient.  Please obtain the following labs; -LIPID FASTING, CBC, CMP, TSH, Hemoglobin A1c  Please obtain chest xray.   Increase lisinopril to 20mg daily.

## 2025-03-03 ENCOUNTER — HOSPITAL ENCOUNTER (EMERGENCY)
Age: 65
Discharge: HOME | End: 2025-03-03
Payer: COMMERCIAL

## 2025-03-03 ENCOUNTER — APPOINTMENT (OUTPATIENT)
Dept: GENERAL RADIOLOGY | Age: 65
End: 2025-03-03
Payer: COMMERCIAL

## 2025-03-03 VITALS
HEIGHT: 70 IN | WEIGHT: 196 LBS | TEMPERATURE: 98.2 F | SYSTOLIC BLOOD PRESSURE: 171 MMHG | DIASTOLIC BLOOD PRESSURE: 80 MMHG | HEART RATE: 81 BPM | OXYGEN SATURATION: 97 % | RESPIRATION RATE: 16 BRPM | BODY MASS INDEX: 28.06 KG/M2

## 2025-03-03 DIAGNOSIS — I10 ELEVATED BLOOD PRESSURE READING WITH DIAGNOSIS OF HYPERTENSION: ICD-10-CM

## 2025-03-03 DIAGNOSIS — L03.119 CELLULITIS OF FOOT: Primary | ICD-10-CM

## 2025-03-03 DIAGNOSIS — M79.671 RIGHT FOOT PAIN: ICD-10-CM

## 2025-03-03 LAB
ALBUMIN SERPL-MCNC: 3.7 G/DL (ref 3.4–5)
ALBUMIN/GLOB SERPL: 1.2 {RATIO} (ref 1.1–2.2)
ALP SERPL-CCNC: 59 U/L (ref 40–129)
ALT SERPL-CCNC: 16 U/L (ref 10–40)
ANION GAP SERPL CALCULATED.3IONS-SCNC: 12 MMOL/L (ref 3–16)
ANISOCYTOSIS BLD QL SMEAR: ABNORMAL
AST SERPL-CCNC: 19 U/L (ref 15–37)
BASOPHILS # BLD: 0 K/UL (ref 0–0.2)
BASOPHILS NFR BLD: 0 %
BILIRUB SERPL-MCNC: 0.6 MG/DL (ref 0–1)
BUN SERPL-MCNC: 9 MG/DL (ref 7–20)
CALCIUM SERPL-MCNC: 8.5 MG/DL (ref 8.3–10.6)
CHLORIDE SERPL-SCNC: 99 MMOL/L (ref 99–110)
CO2 SERPL-SCNC: 25 MMOL/L (ref 21–32)
CREAT SERPL-MCNC: 1 MG/DL (ref 0.8–1.3)
DEPRECATED RDW RBC AUTO: 13.4 % (ref 12.4–15.4)
EOSINOPHIL # BLD: 0 K/UL (ref 0–0.6)
EOSINOPHIL NFR BLD: 0 %
ERYTHROCYTE [SEDIMENTATION RATE] IN BLOOD BY WESTERGREN METHOD: 45 MM/HR (ref 0–20)
GFR SERPLBLD CREATININE-BSD FMLA CKD-EPI: 85 ML/MIN/{1.73_M2}
GLUCOSE SERPL-MCNC: 97 MG/DL (ref 70–99)
HCT VFR BLD AUTO: 41.7 % (ref 40.5–52.5)
HGB BLD-MCNC: 14.2 G/DL (ref 13.5–17.5)
LYMPHOCYTES # BLD: 2.8 K/UL (ref 1–5.1)
LYMPHOCYTES NFR BLD: 19 %
MCH RBC QN AUTO: 30.5 PG (ref 26–34)
MCHC RBC AUTO-ENTMCNC: 34 G/DL (ref 31–36)
MCV RBC AUTO: 89.8 FL (ref 80–100)
MONOCYTES # BLD: 2.1 K/UL (ref 0–1.3)
MONOCYTES NFR BLD: 14 %
NEUTROPHILS # BLD: 9.8 K/UL (ref 1.7–7.7)
NEUTROPHILS NFR BLD: 67 %
PATH INTERP BLD-IMP: YES
PLATELET # BLD AUTO: 250 K/UL (ref 135–450)
PLATELET BLD QL SMEAR: ADEQUATE
PMV BLD AUTO: 7.8 FL (ref 5–10.5)
POTASSIUM SERPL-SCNC: 4.1 MMOL/L (ref 3.5–5.1)
PROT SERPL-MCNC: 6.8 G/DL (ref 6.4–8.2)
RBC # BLD AUTO: 4.64 M/UL (ref 4.2–5.9)
SLIDE REVIEW: ABNORMAL
SODIUM SERPL-SCNC: 136 MMOL/L (ref 136–145)
URATE SERPL-MCNC: 6.6 MG/DL (ref 3.5–7.2)
WBC # BLD AUTO: 14.7 K/UL (ref 4–11)

## 2025-03-03 PROCEDURE — 85025 COMPLETE CBC W/AUTO DIFF WBC: CPT

## 2025-03-03 PROCEDURE — 36415 COLL VENOUS BLD VENIPUNCTURE: CPT

## 2025-03-03 PROCEDURE — 85652 RBC SED RATE AUTOMATED: CPT

## 2025-03-03 PROCEDURE — 80053 COMPREHEN METABOLIC PANEL: CPT

## 2025-03-03 PROCEDURE — 84550 ASSAY OF BLOOD/URIC ACID: CPT

## 2025-03-03 PROCEDURE — 99284 EMERGENCY DEPT VISIT MOD MDM: CPT

## 2025-03-03 PROCEDURE — 6370000000 HC RX 637 (ALT 250 FOR IP): Performed by: PHYSICIAN ASSISTANT

## 2025-03-03 PROCEDURE — 73630 X-RAY EXAM OF FOOT: CPT

## 2025-03-03 RX ORDER — HYDROCODONE BITARTRATE AND ACETAMINOPHEN 5; 325 MG/1; MG/1
1 TABLET ORAL ONCE
Status: COMPLETED | OUTPATIENT
Start: 2025-03-03 | End: 2025-03-03

## 2025-03-03 RX ORDER — PREDNISONE 10 MG/1
40 TABLET ORAL DAILY
Qty: 20 TABLET | Refills: 0 | Status: SHIPPED | OUTPATIENT
Start: 2025-03-03 | End: 2025-03-08

## 2025-03-03 RX ORDER — SULFAMETHOXAZOLE AND TRIMETHOPRIM 800; 160 MG/1; MG/1
1 TABLET ORAL ONCE
Status: COMPLETED | OUTPATIENT
Start: 2025-03-03 | End: 2025-03-03

## 2025-03-03 RX ORDER — SULFAMETHOXAZOLE AND TRIMETHOPRIM 800; 160 MG/1; MG/1
1 TABLET ORAL 2 TIMES DAILY
Qty: 14 TABLET | Refills: 0 | Status: SHIPPED | OUTPATIENT
Start: 2025-03-03 | End: 2025-03-10

## 2025-03-03 RX ORDER — CEPHALEXIN 500 MG/1
1000 CAPSULE ORAL ONCE
Status: COMPLETED | OUTPATIENT
Start: 2025-03-03 | End: 2025-03-03

## 2025-03-03 RX ORDER — CEPHALEXIN 500 MG/1
500 CAPSULE ORAL 4 TIMES DAILY
Qty: 28 CAPSULE | Refills: 0 | Status: SHIPPED | OUTPATIENT
Start: 2025-03-03 | End: 2025-03-10

## 2025-03-03 RX ORDER — HYDROCODONE BITARTRATE AND ACETAMINOPHEN 5; 325 MG/1; MG/1
1 TABLET ORAL
Qty: 7 TABLET | Refills: 0 | Status: SHIPPED | OUTPATIENT
Start: 2025-03-03 | End: 2025-03-06

## 2025-03-03 RX ADMIN — HYDROCODONE BITARTRATE AND ACETAMINOPHEN 1 TABLET: 5; 325 TABLET ORAL at 22:45

## 2025-03-03 RX ADMIN — HYDROCODONE BITARTRATE AND ACETAMINOPHEN 1 TABLET: 5; 325 TABLET ORAL at 21:45

## 2025-03-03 RX ADMIN — CEPHALEXIN 1000 MG: 500 CAPSULE ORAL at 22:44

## 2025-03-03 RX ADMIN — PREDNISONE 30 MG: 5 TABLET ORAL at 22:45

## 2025-03-03 RX ADMIN — SULFAMETHOXAZOLE AND TRIMETHOPRIM 1 TABLET: 800; 160 TABLET ORAL at 22:45

## 2025-03-03 ASSESSMENT — PAIN DESCRIPTION - PAIN TYPE: TYPE: ACUTE PAIN

## 2025-03-03 ASSESSMENT — LIFESTYLE VARIABLES
HOW MANY STANDARD DRINKS CONTAINING ALCOHOL DO YOU HAVE ON A TYPICAL DAY: 5 OR 6
HOW OFTEN DO YOU HAVE A DRINK CONTAINING ALCOHOL: 2-3 TIMES A WEEK

## 2025-03-03 ASSESSMENT — PAIN DESCRIPTION - ORIENTATION
ORIENTATION: RIGHT

## 2025-03-03 ASSESSMENT — PAIN SCALES - GENERAL
PAINLEVEL_OUTOF10: 10

## 2025-03-03 ASSESSMENT — PAIN DESCRIPTION - ONSET: ONSET: ON-GOING

## 2025-03-03 ASSESSMENT — PAIN DESCRIPTION - LOCATION
LOCATION: FOOT

## 2025-03-03 ASSESSMENT — PAIN DESCRIPTION - FREQUENCY: FREQUENCY: CONTINUOUS

## 2025-03-03 ASSESSMENT — PAIN - FUNCTIONAL ASSESSMENT
PAIN_FUNCTIONAL_ASSESSMENT: 0-10
PAIN_FUNCTIONAL_ASSESSMENT: ACTIVITIES ARE NOT PREVENTED

## 2025-03-03 ASSESSMENT — PAIN DESCRIPTION - DESCRIPTORS
DESCRIPTORS: SHARP
DESCRIPTORS: SHARP
DESCRIPTORS: ACHING;BURNING

## 2025-03-04 LAB — PATH INTERP BLD-IMP: NORMAL

## 2025-03-04 NOTE — ED PROVIDER NOTES
will send prescription to his pharmacy for Keflex, hydrocodone, prednisone and Bactrim.  I recommend he follow with healthcare provider in about 3 days for recheck.  Patient is aware to return should symptoms worsen.  The patient repeat blood pressure at discharge 171/80.      I am the Primary Clinician of Record.  FINAL IMPRESSION      1. Cellulitis of foot    2. Right foot pain    3. Elevated blood pressure reading with diagnosis of hypertension          DISPOSITION/PLAN     DISPOSITION Decision To Discharge 03/03/2025 10:28:15 PM   DISPOSITION CONDITION Stable           PATIENT REFERRED TO:  [facility]  [address] [facility]  [address]  [facility] [address]  [phone]  Schedule an appointment as soon as possible for a visit in 3 days      [facility]  [address] [address]  [phone]  Go to   If symptoms worsen      DISCHARGE MEDICATIONS:  Discharge Medication List as of 3/3/2025 10:37 PM        START taking these medications    Details   sulfamethoxazole-trimethoprim (BACTRIM DS) 800-160 MG per tablet Take 1 tablet by mouth 2 times daily for 7 days, Disp-14 tablet, R-0Normal      cephALEXin (KEFLEX) 500 MG capsule Take 1 capsule by mouth 4 times daily for 7 days, Disp-28 capsule, R-0Normal      predniSONE (DELTASONE) 10 MG tablet Take 4 tablets by mouth daily for 5 doses, Disp-20 tablet, R-0Normal      HYDROcodone-acetaminophen (NORCO) 5-325 MG per tablet Take 1 tablet by mouth every 6-8 hours as needed for Pain for up to 3 days. Intended supply: 3 days. Take lowest dose possible to manage pain Max Daily Amount: 4 tablets, Disp-7 tablet, R-0Normal             DISCONTINUED MEDICATIONS:  Discharge Medication List as of 3/3/2025 10:37 PM            Periodic Controlled Substance Monitoring: No signs of potential drug abuse or diversion identified. (Joey Weber PA-C)    (Please note that portions of this note were completed with a voice

## 2025-03-04 NOTE — DISCHARGE INSTRUCTIONS
Blood pressure elevated at 209/74 initially.  Be sure and take your blood pressure medication as prescribed.  Pain may elevate the blood pressure.  With regard to your right foot pain x-ray was negative.  Your WBC was elevated.  Your sed rate was elevated.  Your uric acid was not elevated at 6.6 therefore gout is of low probability but not completely excluded.  In emergency room I did give you Bactrim, Keflex and prednisone.  I also gave the hydrocodone 5/325 2 tablets.  I will send prescription to your pharmacy for prednisone, Bactrim, Keflex and hydrocodone.  I recommend follow-up with healthcare provider later this week.

## 2025-07-10 ENCOUNTER — TELEPHONE (OUTPATIENT)
Dept: CARDIOLOGY CLINIC | Age: 65
End: 2025-07-10

## 2025-07-10 DIAGNOSIS — I10 PRIMARY HYPERTENSION: ICD-10-CM

## 2025-07-10 DIAGNOSIS — I35.0 NONRHEUMATIC AORTIC VALVE STENOSIS: ICD-10-CM

## 2025-07-10 DIAGNOSIS — Z79.899 MEDICATION MANAGEMENT: ICD-10-CM

## 2025-07-10 DIAGNOSIS — I25.119 CORONARY ARTERY DISEASE INVOLVING NATIVE CORONARY ARTERY OF NATIVE HEART WITH ANGINA PECTORIS: Primary | ICD-10-CM

## 2025-07-10 DIAGNOSIS — R07.9 CHEST PAIN, UNSPECIFIED TYPE: ICD-10-CM

## 2025-07-10 NOTE — TELEPHONE ENCOUNTER
Pt was given testing orders in 2024 by [facility] to have done.    Orders now have  and will need re-order.    Labs ordered on 24  Echo ordered 24  Nuclear stress ordered on 24.    Next ov 25  Last ov 24

## 2025-07-27 ENCOUNTER — TRANSCRIBE ORDERS (OUTPATIENT)
Dept: ADMINISTRATIVE | Age: 65
End: 2025-07-27

## 2025-07-27 DIAGNOSIS — Z12.2 ENCOUNTER FOR SCREENING FOR MALIGNANT NEOPLASM OF RESPIRATORY ORGANS: Primary | ICD-10-CM

## 2025-07-27 DIAGNOSIS — F17.210 CIGARETTE SMOKER: ICD-10-CM

## 2025-08-07 ENCOUNTER — HOSPITAL ENCOUNTER (OUTPATIENT)
Age: 65
Discharge: HOME | End: 2025-08-09
Attending: INTERNAL MEDICINE
Payer: COMMERCIAL

## 2025-08-07 ENCOUNTER — HOSPITAL ENCOUNTER (OUTPATIENT)
Dept: NUCLEAR MEDICINE | Age: 65
Discharge: HOME | End: 2025-08-07
Attending: INTERNAL MEDICINE
Payer: COMMERCIAL

## 2025-08-07 VITALS — WEIGHT: 196 LBS | HEIGHT: 70 IN | BODY MASS INDEX: 28.06 KG/M2

## 2025-08-07 DIAGNOSIS — I10 PRIMARY HYPERTENSION: ICD-10-CM

## 2025-08-07 DIAGNOSIS — R07.9 CHEST PAIN, UNSPECIFIED TYPE: ICD-10-CM

## 2025-08-07 DIAGNOSIS — I25.119 CORONARY ARTERY DISEASE INVOLVING NATIVE CORONARY ARTERY OF NATIVE HEART WITH ANGINA PECTORIS: ICD-10-CM

## 2025-08-07 DIAGNOSIS — I35.0 NONRHEUMATIC AORTIC VALVE STENOSIS: ICD-10-CM

## 2025-08-07 LAB
ECHO BSA: 2.1 M2
NUC STRESS EJECTION FRACTION: 52 %
NUC STRESS LV EDV: 161 ML (ref 67–155)
NUC STRESS LV ESV: 78 ML (ref 22–58)
NUC STRESS LV MASS: 180 G
STRESS BASELINE DIAS BP: 79 MMHG
STRESS BASELINE HR: 63 BPM
STRESS BASELINE SYS BP: 199 MMHG
STRESS ESTIMATED WORKLOAD: 2.1 METS
STRESS PEAK DIAS BP: 88 MMHG
STRESS PEAK SYS BP: 229 MMHG
STRESS PERCENT HR ACHIEVED: 71 %
STRESS POST PEAK HR: 112 BPM
STRESS RATE PRESSURE PRODUCT: ABNORMAL BPM*MMHG
STRESS TARGET HR: 158 BPM
TID: 1.18

## 2025-08-07 PROCEDURE — A9502 TC99M TETROFOSMIN: HCPCS | Performed by: INTERNAL MEDICINE

## 2025-08-07 PROCEDURE — 78452 HT MUSCLE IMAGE SPECT MULT: CPT

## 2025-08-07 PROCEDURE — 6360000002 HC RX W HCPCS: Performed by: INTERNAL MEDICINE

## 2025-08-07 PROCEDURE — 93018 CV STRESS TEST I&R ONLY: CPT | Performed by: STUDENT IN AN ORGANIZED HEALTH CARE EDUCATION/TRAINING PROGRAM

## 2025-08-07 PROCEDURE — 93017 CV STRESS TEST TRACING ONLY: CPT

## 2025-08-07 PROCEDURE — 93016 CV STRESS TEST SUPVJ ONLY: CPT | Performed by: STUDENT IN AN ORGANIZED HEALTH CARE EDUCATION/TRAINING PROGRAM

## 2025-08-07 PROCEDURE — 78452 HT MUSCLE IMAGE SPECT MULT: CPT | Performed by: STUDENT IN AN ORGANIZED HEALTH CARE EDUCATION/TRAINING PROGRAM

## 2025-08-07 PROCEDURE — 3430000000 HC RX DIAGNOSTIC RADIOPHARMACEUTICAL: Performed by: INTERNAL MEDICINE

## 2025-08-07 RX ORDER — REGADENOSON 0.08 MG/ML
0.4 INJECTION, SOLUTION INTRAVENOUS
Status: COMPLETED | OUTPATIENT
Start: 2025-08-07 | End: 2025-08-07

## 2025-08-07 RX ADMIN — REGADENOSON 0.4 MG: 0.08 INJECTION, SOLUTION INTRAVENOUS at 11:01

## 2025-08-07 RX ADMIN — TETROFOSMIN 32 MILLICURIE: 1.38 INJECTION, POWDER, LYOPHILIZED, FOR SOLUTION INTRAVENOUS at 11:01

## 2025-08-07 RX ADMIN — TETROFOSMIN 10.5 MILLICURIE: 1.38 INJECTION, POWDER, LYOPHILIZED, FOR SOLUTION INTRAVENOUS at 09:00

## 2025-08-08 ENCOUNTER — RESULTS FOLLOW-UP (OUTPATIENT)
Dept: CARDIOLOGY CLINIC | Age: 65
End: 2025-08-08

## 2025-08-08 DIAGNOSIS — I20.0 UNSTABLE ANGINA (HCC): Primary | ICD-10-CM

## 2025-08-08 DIAGNOSIS — I10 PRIMARY HYPERTENSION: ICD-10-CM

## 2025-08-08 DIAGNOSIS — R94.39 ABNORMAL STRESS TEST: ICD-10-CM

## 2025-08-08 DIAGNOSIS — I25.119 CORONARY ARTERY DISEASE INVOLVING NATIVE CORONARY ARTERY OF NATIVE HEART WITH ANGINA PECTORIS: ICD-10-CM

## 2025-08-19 PROBLEM — R94.39 ABNORMAL STRESS TEST: Status: ACTIVE | Noted: 2025-08-08

## 2025-08-19 PROBLEM — I44.7 LBBB (LEFT BUNDLE BRANCH BLOCK): Status: ACTIVE | Noted: 2025-08-08

## 2025-08-19 PROBLEM — I10 ESSENTIAL HYPERTENSION: Status: ACTIVE | Noted: 2025-08-08

## 2025-08-27 ENCOUNTER — APPOINTMENT (OUTPATIENT)
Age: 65
End: 2025-08-27
Attending: INTERNAL MEDICINE
Payer: COMMERCIAL

## 2025-08-27 ENCOUNTER — HOSPITAL ENCOUNTER (OUTPATIENT)
Age: 65
Discharge: HOME | End: 2025-08-27
Attending: INTERNAL MEDICINE | Admitting: INTERNAL MEDICINE
Payer: COMMERCIAL

## 2025-08-27 VITALS
SYSTOLIC BLOOD PRESSURE: 163 MMHG | DIASTOLIC BLOOD PRESSURE: 70 MMHG | HEIGHT: 69 IN | RESPIRATION RATE: 21 BRPM | OXYGEN SATURATION: 96 % | HEART RATE: 71 BPM | WEIGHT: 192 LBS | BODY MASS INDEX: 28.44 KG/M2

## 2025-08-27 DIAGNOSIS — I35.0 NONRHEUMATIC AORTIC VALVE STENOSIS: ICD-10-CM

## 2025-08-27 DIAGNOSIS — I10 ESSENTIAL HYPERTENSION: ICD-10-CM

## 2025-08-27 DIAGNOSIS — R94.39 ABNORMAL STRESS TEST: Primary | ICD-10-CM

## 2025-08-27 DIAGNOSIS — I44.7 LBBB (LEFT BUNDLE BRANCH BLOCK): ICD-10-CM

## 2025-08-27 DIAGNOSIS — R07.9 CHEST PAIN: ICD-10-CM

## 2025-08-27 LAB
ANION GAP SERPL CALCULATED.3IONS-SCNC: 13 MMOL/L (ref 3–16)
BUN SERPL-MCNC: 13 MG/DL (ref 7–20)
CALCIUM SERPL-MCNC: 9.7 MG/DL (ref 8.3–10.6)
CHLORIDE SERPL-SCNC: 101 MMOL/L (ref 99–110)
CHOLEST SERPL-MCNC: 126 MG/DL (ref 0–199)
CO2 SERPL-SCNC: 25 MMOL/L (ref 21–32)
CREAT SERPL-MCNC: 1.1 MG/DL (ref 0.8–1.3)
DEPRECATED RDW RBC AUTO: 12.7 % (ref 12.4–15.4)
ECHO AO ASC DIAM: 2.9 CM
ECHO AO ASCENDING AORTA INDEX: 1.43 CM/M2
ECHO AO ROOT DIAM: 3.5 CM
ECHO AO ROOT INDEX: 1.72 CM/M2
ECHO AV AREA PEAK VELOCITY: 0.9 CM2
ECHO AV AREA VTI: 1 CM2
ECHO AV AREA/BSA PEAK VELOCITY: 0.4 CM2/M2
ECHO AV AREA/BSA VTI: 0.5 CM2/M2
ECHO AV MEAN GRADIENT: 33 MMHG
ECHO AV MEAN VELOCITY: 2.6 M/S
ECHO AV PEAK GRADIENT: 68 MMHG
ECHO AV PEAK VELOCITY: 4.1 M/S
ECHO AV VELOCITY RATIO: 0.29
ECHO AV VTI: 87.4 CM
ECHO BSA: 2.06 M2
ECHO BSA: 2.06 M2
ECHO EST RA PRESSURE: 8 MMHG
ECHO LA AREA 2C: 20.4 CM2
ECHO LA AREA 4C: 20.8 CM2
ECHO LA MAJOR AXIS: 5.5 CM
ECHO LA MINOR AXIS: 5 CM
ECHO LA VOL BP: 68 ML (ref 18–58)
ECHO LA VOL MOD A2C: 67 ML (ref 18–58)
ECHO LA VOL MOD A4C: 64 ML (ref 18–58)
ECHO LA VOL/BSA BIPLANE: 33 ML/M2 (ref 16–34)
ECHO LA VOLUME INDEX MOD A2C: 33 ML/M2 (ref 16–34)
ECHO LA VOLUME INDEX MOD A4C: 32 ML/M2 (ref 16–34)
ECHO LV E' LATERAL VELOCITY: 4.46 CM/S
ECHO LV E' SEPTAL VELOCITY: 4.9 CM/S
ECHO LV EDV 3D: 154 ML
ECHO LV EDV INDEX 3D: 76 ML/M2
ECHO LV EF PHYSICIAN: 55 %
ECHO LV EJECTION FRACTION 3D: 57 %
ECHO LV ESV 3D: 67 ML
ECHO LV ESV INDEX 3D: 33 ML/M2
ECHO LV FRACTIONAL SHORTENING: 28 % (ref 28–44)
ECHO LV GLOBAL LONGITUDINAL STRAIN (GLS): -10.3 %
ECHO LV INTERNAL DIMENSION DIASTOLE INDEX: 2.27 CM/M2
ECHO LV INTERNAL DIMENSION DIASTOLIC: 4.6 CM (ref 4.2–5.9)
ECHO LV INTERNAL DIMENSION SYSTOLIC INDEX: 1.63 CM/M2
ECHO LV INTERNAL DIMENSION SYSTOLIC: 3.3 CM
ECHO LV IVSD: 1.3 CM (ref 0.6–1)
ECHO LV MASS 2D: 230.2 G (ref 88–224)
ECHO LV MASS 3D INDEX: 109.4 G/M2
ECHO LV MASS 3D: 222 G
ECHO LV MASS INDEX 2D: 113.4 G/M2 (ref 49–115)
ECHO LV POSTERIOR WALL DIASTOLIC: 1.3 CM (ref 0.6–1)
ECHO LV RELATIVE WALL THICKNESS RATIO: 0.57
ECHO LVOT AREA: 3.1 CM2
ECHO LVOT AV VTI INDEX: 0.31
ECHO LVOT DIAM: 2 CM
ECHO LVOT MEAN GRADIENT: 3 MMHG
ECHO LVOT PEAK GRADIENT: 6 MMHG
ECHO LVOT PEAK VELOCITY: 1.2 M/S
ECHO LVOT STROKE VOLUME INDEX: 42.1 ML/M2
ECHO LVOT SV: 85.4 ML
ECHO LVOT VTI: 27.2 CM
ECHO MV A VELOCITY: 0.67 M/S
ECHO MV E DECELERATION TIME (DT): 227 MS
ECHO MV E VELOCITY: 0.69 M/S
ECHO MV E/A RATIO: 1.03
ECHO MV E/E' LATERAL: 15.47
ECHO MV E/E' RATIO (AVERAGED): 14.78
ECHO MV E/E' SEPTAL: 14.08
ECHO RA AREA 4C: 7.9 CM2
ECHO RA END SYSTOLIC VOLUME APICAL 4 CHAMBER INDEX BSA: 6 ML/M2
ECHO RA VOLUME: 13 ML
ECHO RIGHT VENTRICULAR SYSTOLIC PRESSURE (RVSP): 16 MMHG
ECHO RV BASAL DIMENSION: 3.4 CM
ECHO RV FREE WALL PEAK S': 17 CM/S
ECHO RV LONGITUDINAL DIMENSION: 7.5 CM
ECHO RV MID DIMENSION: 2.3 CM
ECHO RV TAPSE: 2.9 CM (ref 1.7–?)
ECHO TV REGURGITANT MAX VELOCITY: 1.38 M/S
ECHO TV REGURGITANT PEAK GRADIENT: 8 MMHG
EKG ATRIAL RATE: 74 BPM
EKG DIAGNOSIS: NORMAL
EKG P AXIS: 55 DEGREES
EKG P-R INTERVAL: 208 MS
EKG Q-T INTERVAL: 442 MS
EKG QRS DURATION: 176 MS
EKG QTC CALCULATION (BAZETT): 490 MS
EKG R AXIS: -35 DEGREES
EKG T AXIS: 54 DEGREES
EKG VENTRICULAR RATE: 74 BPM
GFR SERPLBLD CREATININE-BSD FMLA CKD-EPI: 76 ML/MIN/{1.73_M2}
GLUCOSE SERPL-MCNC: 106 MG/DL (ref 70–99)
HCT VFR BLD AUTO: 45.7 % (ref 40.5–52.5)
HDLC SERPL-MCNC: 38 MG/DL (ref 40–60)
HGB BLD-MCNC: 16.1 G/DL (ref 13.5–17.5)
LDLC SERPL CALC-MCNC: ABNORMAL MG/DL
LDLC SERPL-MCNC: 39 MG/DL
MCH RBC QN AUTO: 31.2 PG (ref 26–34)
MCHC RBC AUTO-ENTMCNC: 35.3 G/DL (ref 31–36)
MCV RBC AUTO: 88.2 FL (ref 80–100)
PLATELET # BLD AUTO: 271 K/UL (ref 135–450)
PMV BLD AUTO: 7.1 FL (ref 5–10.5)
POC ACT LR: 254 SEC
POTASSIUM SERPL-SCNC: 4 MMOL/L (ref 3.5–5.1)
RBC # BLD AUTO: 5.18 M/UL (ref 4.2–5.9)
SODIUM SERPL-SCNC: 139 MMOL/L (ref 136–145)
TRIGL SERPL-MCNC: 337 MG/DL (ref 0–150)
VLDLC SERPL CALC-MCNC: ABNORMAL MG/DL
WBC # BLD AUTO: 9.2 K/UL (ref 4–11)

## 2025-08-27 PROCEDURE — 99152 MOD SED SAME PHYS/QHP 5/>YRS: CPT | Performed by: INTERNAL MEDICINE

## 2025-08-27 PROCEDURE — 93306 TTE W/DOPPLER COMPLETE: CPT | Performed by: INTERNAL MEDICINE

## 2025-08-27 PROCEDURE — 6370000000 HC RX 637 (ALT 250 FOR IP): Performed by: INTERNAL MEDICINE

## 2025-08-27 PROCEDURE — 85347 COAGULATION TIME ACTIVATED: CPT

## 2025-08-27 PROCEDURE — 6360000002 HC RX W HCPCS: Performed by: INTERNAL MEDICINE

## 2025-08-27 PROCEDURE — 93456 R HRT CORONARY ARTERY ANGIO: CPT | Performed by: INTERNAL MEDICINE

## 2025-08-27 PROCEDURE — C1894 INTRO/SHEATH, NON-LASER: HCPCS | Performed by: INTERNAL MEDICINE

## 2025-08-27 PROCEDURE — 93005 ELECTROCARDIOGRAM TRACING: CPT | Performed by: INTERNAL MEDICINE

## 2025-08-27 PROCEDURE — 80048 BASIC METABOLIC PNL TOTAL CA: CPT

## 2025-08-27 PROCEDURE — 85027 COMPLETE CBC AUTOMATED: CPT

## 2025-08-27 PROCEDURE — 2500000003 HC RX 250 WO HCPCS: Performed by: INTERNAL MEDICINE

## 2025-08-27 PROCEDURE — 6360000004 HC RX CONTRAST MEDICATION: Performed by: INTERNAL MEDICINE

## 2025-08-27 PROCEDURE — C1769 GUIDE WIRE: HCPCS | Performed by: INTERNAL MEDICINE

## 2025-08-27 PROCEDURE — 93010 ELECTROCARDIOGRAM REPORT: CPT | Performed by: INTERNAL MEDICINE

## 2025-08-27 PROCEDURE — 7100000011 HC PHASE II RECOVERY - ADDTL 15 MIN: Performed by: INTERNAL MEDICINE

## 2025-08-27 PROCEDURE — 99153 MOD SED SAME PHYS/QHP EA: CPT | Performed by: INTERNAL MEDICINE

## 2025-08-27 PROCEDURE — 76376 3D RENDER W/INTRP POSTPROCES: CPT | Performed by: INTERNAL MEDICINE

## 2025-08-27 PROCEDURE — 76376 3D RENDER W/INTRP POSTPROCES: CPT

## 2025-08-27 PROCEDURE — 93356 MYOCRD STRAIN IMG SPCKL TRCK: CPT | Performed by: INTERNAL MEDICINE

## 2025-08-27 PROCEDURE — 80061 LIPID PANEL: CPT

## 2025-08-27 PROCEDURE — C1887 CATHETER, GUIDING: HCPCS | Performed by: INTERNAL MEDICINE

## 2025-08-27 PROCEDURE — 7100000010 HC PHASE II RECOVERY - FIRST 15 MIN: Performed by: INTERNAL MEDICINE

## 2025-08-27 PROCEDURE — 2709999900 HC NON-CHARGEABLE SUPPLY: Performed by: INTERNAL MEDICINE

## 2025-08-27 RX ORDER — SODIUM CHLORIDE 0.9 % (FLUSH) 0.9 %
5-40 SYRINGE (ML) INJECTION EVERY 12 HOURS SCHEDULED
Status: DISCONTINUED | OUTPATIENT
Start: 2025-08-27 | End: 2025-08-27 | Stop reason: HOSPADM

## 2025-08-27 RX ORDER — SODIUM CHLORIDE 0.9 % (FLUSH) 0.9 %
5-40 SYRINGE (ML) INJECTION PRN
Status: DISCONTINUED | OUTPATIENT
Start: 2025-08-27 | End: 2025-08-27 | Stop reason: HOSPADM

## 2025-08-27 RX ORDER — HEPARIN SODIUM 1000 [USP'U]/ML
INJECTION, SOLUTION INTRAVENOUS; SUBCUTANEOUS PRN
Status: DISCONTINUED | OUTPATIENT
Start: 2025-08-27 | End: 2025-08-27 | Stop reason: HOSPADM

## 2025-08-27 RX ORDER — ONDANSETRON 2 MG/ML
4 INJECTION INTRAMUSCULAR; INTRAVENOUS EVERY 6 HOURS PRN
Status: DISCONTINUED | OUTPATIENT
Start: 2025-08-27 | End: 2025-08-27 | Stop reason: HOSPADM

## 2025-08-27 RX ORDER — MIDAZOLAM HYDROCHLORIDE 1 MG/ML
INJECTION, SOLUTION INTRAMUSCULAR; INTRAVENOUS PRN
Status: DISCONTINUED | OUTPATIENT
Start: 2025-08-27 | End: 2025-08-27 | Stop reason: HOSPADM

## 2025-08-27 RX ORDER — IOPAMIDOL 755 MG/ML
INJECTION, SOLUTION INTRAVASCULAR PRN
Status: DISCONTINUED | OUTPATIENT
Start: 2025-08-27 | End: 2025-08-27 | Stop reason: HOSPADM

## 2025-08-27 RX ORDER — LORAZEPAM 0.5 MG/1
0.5 TABLET ORAL
Status: DISCONTINUED | OUTPATIENT
Start: 2025-08-27 | End: 2025-08-27 | Stop reason: HOSPADM

## 2025-08-27 RX ORDER — FENTANYL CITRATE 50 UG/ML
INJECTION, SOLUTION INTRAMUSCULAR; INTRAVENOUS PRN
Status: DISCONTINUED | OUTPATIENT
Start: 2025-08-27 | End: 2025-08-27 | Stop reason: HOSPADM

## 2025-08-27 RX ORDER — SODIUM CHLORIDE 9 MG/ML
INJECTION, SOLUTION INTRAVENOUS PRN
Status: DISCONTINUED | OUTPATIENT
Start: 2025-08-27 | End: 2025-08-27 | Stop reason: HOSPADM

## 2025-08-27 RX ORDER — ASPIRIN 81 MG/1
81 TABLET, CHEWABLE ORAL ONCE
Status: COMPLETED | OUTPATIENT
Start: 2025-08-27 | End: 2025-08-27

## 2025-08-27 RX ORDER — ASPIRIN 325 MG
325 TABLET ORAL ONCE
Status: DISCONTINUED | OUTPATIENT
Start: 2025-08-27 | End: 2025-08-27 | Stop reason: HOSPADM

## 2025-08-27 RX ADMIN — ASPIRIN 81 MG: 81 TABLET, CHEWABLE ORAL at 09:06

## 2025-09-03 LAB — ECHO BSA: 2.06 M2

## 2025-09-05 ENCOUNTER — OFFICE VISIT (OUTPATIENT)
Dept: CARDIOLOGY CLINIC | Age: 65
End: 2025-09-05

## 2025-09-05 ENCOUNTER — HOSPITAL ENCOUNTER (OUTPATIENT)
Dept: GENERAL RADIOLOGY | Age: 65
Discharge: HOME | End: 2025-09-05
Payer: COMMERCIAL

## 2025-09-05 VITALS
DIASTOLIC BLOOD PRESSURE: 80 MMHG | SYSTOLIC BLOOD PRESSURE: 160 MMHG | BODY MASS INDEX: 28.41 KG/M2 | WEIGHT: 191.8 LBS | HEIGHT: 69 IN | OXYGEN SATURATION: 97 % | HEART RATE: 73 BPM

## 2025-09-05 DIAGNOSIS — I10 PRIMARY HYPERTENSION: ICD-10-CM

## 2025-09-05 DIAGNOSIS — Z72.0 TOBACCO ABUSE: ICD-10-CM

## 2025-09-05 DIAGNOSIS — Z95.5 PRESENCE OF STENT IN CORONARY ARTERY IN PATIENT WITH CORONARY ARTERY DISEASE: ICD-10-CM

## 2025-09-05 DIAGNOSIS — I25.10 CORONARY ARTERY DISEASE INVOLVING NATIVE CORONARY ARTERY OF NATIVE HEART WITHOUT ANGINA PECTORIS: ICD-10-CM

## 2025-09-05 DIAGNOSIS — R06.89 DECREASED BREATH SOUNDS: ICD-10-CM

## 2025-09-05 DIAGNOSIS — I25.10 PRESENCE OF STENT IN CORONARY ARTERY IN PATIENT WITH CORONARY ARTERY DISEASE: ICD-10-CM

## 2025-09-05 DIAGNOSIS — R07.9 CHEST PAIN, UNSPECIFIED TYPE: Primary | ICD-10-CM

## 2025-09-05 DIAGNOSIS — I35.0 SEVERE AORTIC STENOSIS: ICD-10-CM

## 2025-09-05 PROCEDURE — 71046 X-RAY EXAM CHEST 2 VIEWS: CPT

## 2025-09-05 RX ORDER — AMLODIPINE BESYLATE 5 MG/1
5 TABLET ORAL DAILY
Qty: 90 TABLET | Refills: 3 | Status: SHIPPED | OUTPATIENT
Start: 2025-09-05

## 2025-09-05 RX ORDER — NITROGLYCERIN 0.4 MG/1
0.4 TABLET SUBLINGUAL EVERY 5 MIN PRN
Qty: 25 TABLET | Refills: 3 | Status: SHIPPED | OUTPATIENT
Start: 2025-09-05